# Patient Record
Sex: FEMALE | Race: WHITE | NOT HISPANIC OR LATINO | Employment: OTHER | URBAN - METROPOLITAN AREA
[De-identification: names, ages, dates, MRNs, and addresses within clinical notes are randomized per-mention and may not be internally consistent; named-entity substitution may affect disease eponyms.]

---

## 2023-01-18 ENCOUNTER — TRANSFERRED RECORDS (OUTPATIENT)
Dept: HEALTH INFORMATION MANAGEMENT | Facility: CLINIC | Age: 68
End: 2023-01-18

## 2023-01-23 ENCOUNTER — TRANSCRIBE ORDERS (OUTPATIENT)
Dept: OTHER | Age: 68
End: 2023-01-23

## 2023-01-23 DIAGNOSIS — L23.9 ALLERGIC CONTACT DERMATITIS, UNSPECIFIED CAUSE: Primary | ICD-10-CM

## 2023-02-12 ENCOUNTER — HEALTH MAINTENANCE LETTER (OUTPATIENT)
Age: 68
End: 2023-02-12

## 2023-02-27 ENCOUNTER — TRANSFERRED RECORDS (OUTPATIENT)
Dept: HEALTH INFORMATION MANAGEMENT | Facility: CLINIC | Age: 68
End: 2023-02-27

## 2023-05-03 NOTE — TELEPHONE ENCOUNTER
FUTURE VISIT INFORMATION      FUTURE VISIT INFORMATION:    Date: 7.25.23    Time: 8:00    [place] [facility] [place]:    Referring provider:  Gee    Referring providers clinic:  [facility]    Reason for visit/diagnosis  Patch testing related to possible allergic contact dermatitis around her ostomy site./ referred by Dr Junior/ Gee/ eufemia in Epic/ appt sched per pt    RECORDS REQUESTED FROM:       Clinic name Comments Records Status Imaging Status   [facility] 1.18.23  Gee Received  In Epic

## 2023-07-23 NOTE — PROGRESS NOTES
[facility] Dermato-allergology Note  Office visit  Encounter Date: Jul 25, 2023  ____________________________________________    CC: Stoma site reaction     HPI:  (Jul 25, 2023)  Ms. Estrella Salas is a(n) 68 year old female who presents today as new patient for allergy consultation.     She was referred patch testing related to possible allergic contact dermatitis around her ostomy site/stoma which has been going on for about a year     About a year ago, developed a rash around her stoma that came up suddenly, intensely pruritic, and she tried using antibiotic ointment , nystatin and stoma powder, oral antibiotic course which provided no relief. Had not tried changing the stoma supplies.     Has been having reactions to band-aid, has had crusting and irritation to the ear with wearing earrings however this has cleared up     Sulfa allergy - 2/2023 reported that liver enzymes were elevated with his, no skin eruption or anaphylaxis. No other reactions to medications.     Has had 2 reactions to tocilizumab and abatacept, developed a deeper itching sensation, no prior diagnosis of eczema although had a rash to her scalp which was quite itchy with no visible rash. During tocilizumab, at site of infusion, urticarial reaction and hives on neck.     - otherwise feeling well in usual state of health    Physical exam:  General: In no acute distress, well-developed, well-nourished  Eyes: no conjunctivitis  ENT: no signs of rhinitis   Pulmonary: no wheezing or coughing  Skin: Focused examination of the skin on test sites was performed = see test results below  Focused examination of the abdomen and area surrounding colostomy was performed.  - well demarcated area of erythematous plaque surrounding ostomy site, improved from prior based on photos           Past Medical History:   There is no problem list on file for this patient.    No past medical history on file.    Allergies:  Not on  File    Medications:  No current outpatient medications on file.     No current facility-administered medications for this visit.       Social History:  The patient is retired. Formally an . Patient has the following hobbies or non-occupational exposure, loves to sew, has twin grandchildren. No recent travel or exposures.     Family History:  No family history on file.    Previous Labs, Allergy Tests, Dermatopathology, Imaging:  Previous prick testing - positive for pine allergy, no other allergies at that time     Referred By: Sandi Flynn MD  DERMATOLOGY [address] [address]     Allergy Tests:    Past Allergy Test- past testing about 10 years ago showed allergy to pine needles     Order for Future Allergy Testing:    [x] Outpatient  [] Inpatient: [facility]..../ Bed ....       Skin Atopy (atopic dermatitis) [x] Yes   [] No .........  Contact allergies:   [x] Yes   [] No ..........  Hand eczema:   [] Yes   [x] No           Leading hand:   [] R   [] L       [] Ambidextrous         Drug allergies:        [x] Yes   [] No  which?....Sulfa - LFT rising, biologics are in question..    Urticaria/Angioedema  [] Yes   [x] No .........  Food Allergy:  [] Yes   [x] No  which?......  Pets :  [] Yes   [x] No  which?......         []  Rhinitis   [] Conjunctivitis   [x] Sinusitis   [] Polyposis   [] Otitis   [] Pharyngitis         [x]  Postnasal drip    []  none  Operations:   [] Tonsils   [] Septum   [] Sinus   [] Polyposis        [] Asthma bronchiale   [] Coughing      [x]  none  Symptoms (mostly Rhinoconjunctivitis and Asthma) aggravated by:  Season   [] I   [] II   [] III   [] IV   []V   []VI   []VII   []VIII   []IX   [x]X   [x]XI   [x]XII     [] perennial   Day time      [] morning   [] noon      [] evening        [] night    [] whole day........  [x]  none  Location/changes    [] inside        [] outside   [] mountains    [] sea     [] others.............   [x]  none  Triggers,  specific     [] animals     [] plants     [] dust              [x] others ...........................    []  none  Triggers, others       [] work          [] psyche    [] sport            [] others .............................  [x]  none  Irritant                [] phys efforts [] smoke    [] heat/cold     [] odors  []others............... [x]  none    Order for PATCH TESTS  Reason for tests (suspected allergy): allergic contact dermatitis to stoma products  Known previous allergies: pine   Standardized panels  [x] Standard panel (40 tests)  [x] Preservatives & Antimicrobials (31 tests)  [x] Emulsifiers & Additives (25 tests)   [x] Perfumes/Flavours & Plants (25 tests)  [] Hairdresser panel (12 tests)  [x] Rubber Chemicals (22 tests)  [x] Plastics (26 tests)  [] Colorants/Dyes/Food additives (20 tests)  [] Metals (implants/dental) (24 tests)  [] Local anaesthetics/NSAIDs (13 tests)  [] Antibiotics & Antimycotics (14 tests)   [] Corticosteroids (15 tests)   [] Photopatch test (62 tests)   [] others: ...      [x] Patient's own products: stoma adhesive and stoma paste    DO NOT test if chemical or biological identity is unknown!     always ask from patient the product information and safety sheets (MSDS)       Order for PRICK TESTS    Reason for tests (suspected allergy): atopy?  Known previous allergies: previous tests positive to pine    Standardized prick panels  [x] Atopic panel (20 tests)  [] Pediatric Panel (12 tests)  [] Milk, Meat, Eggs, Grains (20 tests)   [] Dust, Epithelia, Feathers (10 tests)  [] Fish, Seafood, Shellfish (17 tests)  [] Nuts, Beans (14 tests)  [] Spice, Vegetable, Fruit (17 tests)  [] Pollen Panel = Tree, Grass, Weed (24 tests)  [] Others: ...      [] Patient's own products: ...    DO NOT test if chemical or biological identity is unknown!     always ask from patient the product information and safety sheets (MSDS)     Standardized intradermal tests  [] Penicillium notatum [] Aspergillus  fumigatus [] House dust mites D.far & D. pteron  [] Cat    [] dog  [] Others: ...  [] Bee venom   [] Wasp venom  !!Specific protocol with dilutions!!       Order for Drug allergy tests (prick & Intradermal & patch tests)    [] Penicillin G  [] Ampicillin [] Cefazolin   [] Ceftriaxone   [] Ceftazidime  [] Bactrim    [] Others: ...  Order for ... as test date    [] Patient needs consultation with Allergy team (changes of tests may apply)  [x] Tests discussed with Allergy team (can have direct appointment for allergy tests)     ________________________________    Assessment & Plan:    ==> Final Diagnosis:     # around the stoma severe, recurrent dermatitis  DDx allergic contact dermatitis to stoma adhesives or disinfectants  DDx irritant/microbial dermatitis with atopy  * undiagnosed new problem with uncertain prognosis   Patient developed a sudden onset, erythematous rash surrounding her stoma site about a year ago. Stoma related to SBO requiring emergent surgical intervention. Since that time, she has tried nystatin, topical antibiotics, oral antibiotics with no relief. No changes to stoma supplies. At this time, her dermatitis is improved from prior as she has simplified her cleansing regimen. At this time I am most suspicious for a contact dermatitis related to cleansing supplies vs adhesive. Antimicrobial is possible, however seems less likely given treatment attempts. Irritant from bowel contents also unlikely given her good stoma cares and minimal leakage.   - stop using previously used disinfectants   - start gentain violet for cleansing   - mometasone solution twice weekly   - allergy testing will be scheduled as above     # suspicion for atopic predisposition with    Prior prick tests positive to pine pollen  * stable chronic illness  - allergy testing will be scheduled as above     These conclusions are made at the best of one's knowledge and belief based on the provided evidence such as patient's history  and allergy test results and they can change over time or can be incomplete because of missing information's.    ==> Treatment Plan:  - plan for testing as above   - will be placed on priority list     Staff and Resident:  Provider and Laura Roland DO , Derm Resident     Staff Physician Comments:  I saw and evaluated the patient with the resident and I agree with the assessment and plan as documented in the resident's note.    Zak Block MD  Professor  Head of Dermato-Allergy Division  Department of Dermatology  [facility]    Follow-up in Derm-Allergy clinic, as scheduled     I spent a total of 35 minutes with Estrella Salas. This time was spent counseling the patient and/or coordinating care, explaining the allergy tests

## 2023-07-25 ENCOUNTER — OFFICE VISIT (OUTPATIENT)
Dept: ALLERGY | Facility: CLINIC | Age: 68
End: 2023-07-25
Payer: MEDICARE

## 2023-07-25 ENCOUNTER — PRE VISIT (OUTPATIENT)
Dept: ALLERGY | Facility: CLINIC | Age: 68
End: 2023-07-25

## 2023-07-25 DIAGNOSIS — L23.5 ALLERGIC DERMATITIS DUE TO OTHER CHEMICAL PRODUCT: ICD-10-CM

## 2023-07-25 DIAGNOSIS — Z88.9 ATOPY: Primary | ICD-10-CM

## 2023-07-25 PROCEDURE — 99203 OFFICE O/P NEW LOW 30 MIN: CPT | Mod: GC | Performed by: DERMATOLOGY

## 2023-07-25 RX ORDER — MOMETASONE FUROATE 1 MG/ML
SOLUTION TOPICAL
Qty: 60 ML | Refills: 2 | Status: SHIPPED | OUTPATIENT
Start: 2023-07-26

## 2023-07-25 RX ORDER — SULFASALAZINE 500 MG/1
500 TABLET ORAL
COMMUNITY

## 2023-07-25 RX ORDER — ACETAMINOPHEN 500 MG
1000 TABLET ORAL
COMMUNITY

## 2023-07-25 RX ORDER — HYDROXYCHLOROQUINE SULFATE 200 MG/1
400 TABLET, FILM COATED ORAL
COMMUNITY
Start: 2023-02-06

## 2023-07-25 RX ORDER — GABAPENTIN 300 MG/1
CAPSULE ORAL
COMMUNITY

## 2023-07-25 RX ORDER — CALCIUM CARBONATE 260MG(650)
3 TABLET,CHEWABLE ORAL
COMMUNITY

## 2023-07-25 RX ORDER — LINACLOTIDE 145 UG/1
1 CAPSULE, GELATIN COATED ORAL DAILY
COMMUNITY
Start: 2023-07-14

## 2023-07-25 RX ORDER — ALBUTEROL SULFATE 90 UG/1
AEROSOL, METERED RESPIRATORY (INHALATION)
COMMUNITY

## 2023-07-25 RX ORDER — MOMETASONE FUROATE 1 MG/ML
SOLUTION TOPICAL DAILY
Qty: 60 ML | Refills: 2 | Status: SHIPPED | OUTPATIENT
Start: 2023-07-25 | End: 2023-07-25

## 2023-07-25 RX ORDER — RABEPRAZOLE SODIUM 20 MG/1
TABLET, DELAYED RELEASE ORAL
COMMUNITY

## 2023-07-25 RX ORDER — ROPINIROLE 2 MG/1
2 TABLET, FILM COATED, EXTENDED RELEASE ORAL
COMMUNITY
Start: 2022-12-19

## 2023-07-25 RX ORDER — ROSUVASTATIN CALCIUM 40 MG/1
1 TABLET, COATED ORAL DAILY
COMMUNITY
Start: 2023-02-05

## 2023-07-25 RX ORDER — FAMOTIDINE 20 MG/1
20 TABLET, FILM COATED ORAL
COMMUNITY

## 2023-07-25 RX ORDER — TRIAMCINOLONE ACETONIDE 1 MG/G
CREAM TOPICAL 2 TIMES DAILY
COMMUNITY
Start: 2022-12-19

## 2023-07-25 RX ORDER — FLUCONAZOLE 150 MG/1
TABLET ORAL
COMMUNITY
Start: 2022-11-09

## 2023-07-25 RX ORDER — METOPROLOL TARTRATE 25 MG/1
25 TABLET, FILM COATED ORAL
COMMUNITY
Start: 2023-07-18

## 2023-07-25 RX ORDER — ONDANSETRON 4 MG/1
4 TABLET, ORALLY DISINTEGRATING ORAL
COMMUNITY
Start: 2023-07-10

## 2023-07-25 RX ORDER — DULOXETIN HYDROCHLORIDE 20 MG/1
3 CAPSULE, DELAYED RELEASE ORAL DAILY
COMMUNITY
Start: 2023-05-23

## 2023-07-25 RX ORDER — CLOBETASOL PROPIONATE 0.5 MG/G
OINTMENT TOPICAL
COMMUNITY
Start: 2023-07-17

## 2023-07-25 RX ORDER — CYCLOBENZAPRINE HCL 10 MG
10 TABLET ORAL 3 TIMES DAILY PRN
COMMUNITY

## 2023-07-25 RX ORDER — POLYETHYLENE GLYCOL 3350
17 POWDER (GRAM) MISCELLANEOUS
COMMUNITY

## 2023-07-25 RX ORDER — CHLORAL HYDRATE 500 MG
1000 CAPSULE ORAL
COMMUNITY

## 2023-07-25 RX ORDER — ESTRADIOL 0.1 MG/G
CREAM VAGINAL
COMMUNITY
Start: 2022-05-02

## 2023-07-25 RX ORDER — NITROGLYCERIN 0.4 MG/1
TABLET SUBLINGUAL
COMMUNITY
Start: 2023-02-10

## 2023-07-25 RX ORDER — ISOSORBIDE MONONITRATE 60 MG/1
TABLET, EXTENDED RELEASE ORAL
COMMUNITY

## 2023-07-25 RX ORDER — BENZOCAINE/MENTHOL 6 MG-10 MG
LOZENGE MUCOUS MEMBRANE
COMMUNITY

## 2023-07-25 NOTE — NURSING NOTE
Chief Complaint   Patient presents with    Allergy Consult     Here because she has had a rash around stoma for a year that does not go away     Carito DURHAM, RN-BSN  Dermatology Surgery  [phone]

## 2023-07-25 NOTE — Clinical Note
7/25/2023         RE: Estrella Salas  [address]  [place] [address]        Dear Colleague,    Thank you for referring your patient, Estrella Salas, to the [facility] [place]. Please see a copy of my visit note below.    [facility] Dermato-allergology Note  Office visit  Encounter Date: Jul 25, 2023  ____________________________________________    CC: Stoma site reaction     HPI:  (Jul 25, 2023)  Ms. Estrella Salas is a(n) 68 year old female who presents today as new patient for allergy consultation.     She was referred patch testing related to possible allergic contact dermatitis around her ostomy site/stoma which has been going on for about a year     About a year ago, developed a rash around her stoma that came up suddenly, intensely pruritic, and she tried using antibiotic ointment , nystatin and stoma powder, oral antibiotic course which provided no relief. Had not tried changing the stoma supplies.     Has been having reactions to band-aid, has had crusting and irritation to the ear with wearing earrings however this has cleared up     Sulfa allergy - 2/2023 reported that liver enzymes were elevated with his, no skin eruption or anaphylaxis. No other reactions to medications.     Has had 2 reactions to tocilizumab and abatacept, developed a deeper itching sensation, no prior diagnosis of eczema although had a rash to her scalp which was quite itchy with no visible rash. During tocilizumab, at site of infusion, urticarial reaction and hives on neck.     - otherwise feeling well in usual state of health    Physical exam:  General: In no acute distress, well-developed, well-nourished  Eyes: no conjunctivitis  ENT: no signs of rhinitis   Pulmonary: no wheezing or coughing  Skin: Focused examination of the skin on test sites was performed = see test results below  Focused examination of the abdomen and area surrounding colostomy was performed.  - well  demarcated area of erythematous plaque surrounding ostomy site, improved from prior based on photos           Past Medical History:   There is no problem list on file for this patient.    No past medical history on file.    Allergies:  Not on File    Medications:  No current outpatient medications on file.     No current facility-administered medications for this visit.       Social History:  The patient is retired. Formally an . Patient has the following hobbies or non-occupational exposure, loves to sew, has twin grandchildren. No recent travel or exposures.     Family History:  No family history on file.    Previous Labs, Allergy Tests, Dermatopathology, Imaging:  Previous prick testing - positive for pine allergy, no other allergies at that time     Referred By: Sandi Flynn MD  DERMATOLOGY [place] [id number] [address] [address]     Allergy Tests:    Past Allergy Test- past testing about 10 years ago showed allergy to pine needles     Order for Future Allergy Testing:    [x] Outpatient  [] Inpatient: [facility]..../ Bed ....       Skin Atopy (atopic dermatitis) [x] Yes   [] No .........  Contact allergies:   [x] Yes   [] No ..........  Hand eczema:   [] Yes   [x] No           Leading hand:   [] R   [] L       [] Ambidextrous         Drug allergies:        [x] Yes   [] No  which?....Sulfa - LFT rising, biologics are in question..    Urticaria/Angioedema  [] Yes   [x] No .........  Food Allergy:  [] Yes   [x] No  which?......  Pets :  [] Yes   [x] No  which?......         []  Rhinitis   [] Conjunctivitis   [x] Sinusitis   [] Polyposis   [] Otitis   [] Pharyngitis         [x]  Postnasal drip    []  none  Operations:   [] Tonsils   [] Septum   [] Sinus   [] Polyposis        [] Asthma bronchiale   [] Coughing      [x]  none  Symptoms (mostly Rhinoconjunctivitis and Asthma) aggravated by:  Season   [] I   [] II   [] III   [] IV   []V   []VI   []VII   []VIII   []IX   [x]X   [x]XI   [x]XII      [] perennial   Day time      [] morning   [] noon      [] evening        [] night    [] whole day........  [x]  none  Location/changes    [] inside        [] outside   [] mountains    [] sea     [] others.............   [x]  none  Triggers, specific     [] animals     [] plants     [] dust              [x] others ...........................    []  none  Triggers, others       [] work          [] psyche    [] sport            [] others .............................  [x]  none  Irritant                [] phys efforts [] smoke    [] heat/cold     [] odors  []others............... [x]  none    Order for PATCH TESTS  Reason for tests (suspected allergy): allergic contact dermatitis to stoma products  Known previous allergies: pine   Standardized panels  [x] Standard panel (40 tests)  [x] Preservatives & Antimicrobials (31 tests)  [x] Emulsifiers & Additives (25 tests)   [x] Perfumes/Flavours & Plants (25 tests)  [] Hairdresser panel (12 tests)  [x] Rubber Chemicals (22 tests)  [x] Plastics (26 tests)  [] Colorants/Dyes/Food additives (20 tests)  [] Metals (implants/dental) (24 tests)  [] Local anaesthetics/NSAIDs (13 tests)  [] Antibiotics & Antimycotics (14 tests)   [] Corticosteroids (15 tests)   [] Photopatch test (62 tests)   [] others: ...      [x] Patient's own products: stoma adhesive and stoma paste  DO NOT test if chemical or biological identity is unknown!   always ask from patient the product information and safety sheets (MSDS)       Order for PRICK TESTS    Reason for tests (suspected allergy): atopy?  Known previous allergies: previous tests positive to pine    Standardized prick panels  [x] Atopic panel (20 tests)  [] Pediatric Panel (12 tests)  [] Milk, Meat, Eggs, Grains (20 tests)   [] Dust, Epithelia, Feathers (10 tests)  [] Fish, Seafood, Shellfish (17 tests)  [] Nuts, Beans (14 tests)  [] Spice, Vegetable, Fruit (17 tests)  [] Pollen Panel = Tree, Grass, Weed (24 tests)  [] Others: ...      []  Patient's own products: ...  DO NOT test if chemical or biological identity is unknown!   always ask from patient the product information and safety sheets (MSDS)     Standardized intradermal tests  [] Penicillium notatum [] Aspergillus fumigatus [] House dust mites D.far & D. pteron  [] Cat    [] dog  [] Others: ...  [] Bee venom   [] Wasp venom  !!Specific protocol with dilutions!!       Order for Drug allergy tests (prick & Intradermal & patch tests)    [] Penicillin G  [] Ampicillin [] Cefazolin   [] Ceftriaxone   [] Ceftazidime  [] Bactrim    [] Others: ...  Order for ... as test date    [] Patient needs consultation with Allergy team (changes of tests may apply)  [] Tests discussed with Allergy team (can have direct appointment for allergy tests)     ________________________________    Assessment & Plan:    ==> Final Diagnosis:     # around the stoma severe, recurrent dermatitis  DDx allergic contact dermatitis to stoma adhesives or disinfectants  DDx irritant/microbial dermatitis with atopy  * undiagnosed new problem with uncertain prognosis   Patient developed a sudden onset, erythematous rash surrounding her stoma site about a year ago. Stoma related to SBO requiring emergent surgical intervention. Since that time, she has tried nystatin, topical antibiotics, oral antibiotics with no relief. No changes to stoma supplies. At this time, her dermatitis is improved from prior as she has simplified her cleansing regimen. At this time I am most suspicious for a contact dermatitis related to cleansing supplies vs adhesive. Antimicrobial is possible, however seems less likely given treatment attempts. Irritant from bowel contents also unlikely given her good stoma cares and minimal leakage.   - stop using previously used disinfectants   - start gentain violet for cleansing   - mometasone solution twice weekly   - allergy testing will be scheduled as above     # suspicion for atopic predisposition with  Prior prick  tests positive to pine pollen  * stable chronic illness  - allergy testing will be scheduled as above     These conclusions are made at the best of one's knowledge and belief based on the provided evidence such as patient's history and allergy test results and they can change over time or can be incomplete because of missing information's.    ==> Treatment Plan:  - plan for testing as above   - will be placed on priority list     Procedures Performed: Allergy tests, including prick, intradermal and patch tests and drug allergy or provocation tests ordered but not performed today     Staff and Resident:  Provider and Laura Roland DO , Derm Resident     Follow-up in Derm-Allergy clinic, as scheduled     I spent a total of [5:10:15:20:25:30:35:40:45:50:55:60:***] minutes with Estrella Salas. This time was spent counseling the patient and/or coordinating care, explaining the allergy tests *** or procedures, performing allergy tests and assessing the clinical relevance.        Again, thank you for allowing me to participate in the care of your patient.        Sincerely,        Zak Block MD

## 2023-07-25 NOTE — PATIENT INSTRUCTIONS
_____________________________    PATCH TESTING    WHAT IS A PATCH TEST ?    A patch test is a way of identifying whether a substance has caused a delayed reaction with skin inflammation, such as contact eczema or delayed (after days) reactions to drugs. We will use various different types of test compounds, which may include common allergens in occupation and daily life such as  preservatives, fragrances or even drugs. Most of the time we will use standardized, prefabricated test solutions and the choice of the substances and series tested will depend on the history of the allergic reactions. Sometimes we will test your own products you are exposed at home or at work. In order to avoid severe toxic reactions we need detailed information s or safety sheets about each of these test compounds.    HOW IS A PATCH TEST PERFORMED ?    You will be given three appointments to complete this test. On the first appointment the nurse will apply 100-180 small test chambers each one of them containing a different allergen on your back and/or on your arms. We will use hypoallergenic and somehow waterproof adhesive tape. Afterwards the different sites will be marked with a waterproof marker. These patches must stay in place for 2 days. On the second appointment the patches will be removed and the allergy doctor/nurse will evaluate the skin reactions and maybe re-apply the marks. On the third appointment the allergy doctor will re-evaluate possible allergic reactions and discuss with you the nature of the allergens you react to and how to avoid them.    AVOID THE FOLLOWING:    DO NOT wash your back and other test areas during the entire test period (3-5 days), NO bathing or swimming  AVOID strenuous exercise with sweating  DO NOT scratch the test area  AVOID exposure to UV irradiation (sun, therapy)    Patch tests should be performed when the allergy is resolved  Remove patch tests only if severe reaction (itch, pain, blistering)  and report to your doctor immediately. Teresa then the locations of each test field  If patch tests peel off, then try to fix them and record time and teresa test field  No oral steroids (e.g. Prednisone) 1 month prior to tests    WHAT ARE THE POSSIBLE RISKS OF PATCH TESTS ?    If you are allergic to a compound tested you will develop after a few days localized skin reactions similar to your previous allergic reaction. This includes formation of red, itchy skin lesions of few mm to cm with small vesicles and even blisters. The lesions will fade off over days and weeks and might sometimes leave for a few months some skin pigmentations. In rare cases a localized reaction to patch tests can become generalized. The tests with your own products might have some risks because they are not standardized and unanticipated reactions can occur. We need as much information as possible to evaluate if your own products are safe to test and under what conditions. This has to be evaluated for each individual product.        ? WHAT ARE THE PREPARATIONS NEEDED FOR THESE VARIOUS ALLERGY TESTS ?    Some medications can affect the reactions to allergens during the tests. Therefore reveal all the medications you take to the allergy team and the doctor will discuss with you the medications before/during the tests. Normally, you have to respect following rules (unless otherwise instructed by doctor):  For prick, intradermal and provocation tests stop antihistamines (e.g. loratadine (Claritin), cetirizine (Zyrtec), fexofenadine (Allegra) etc 1 week prior to the appointment   For patch tests and provocation tests, stop systemic corticosteroids 1 month and topical steroids 1 week prior to tests  Eat normally and take a shower before tests    _____________________________    SKIN PRICK TESTING    WHAT IS A SKIN PRICK TEST (SPT) ?    A skin prick test (SPT) is a simple and reliable diagnostic test used to identify substances ( allergens )  responsible for triggering the symptoms of allergy. The basic SPT panel includes common allergens, such as house dust mites, molds, dog and cat allergens and grass/tree pollen allergens. We have other more specialized series for various food allergens and sometimes we test your own food directly on your skin. Tests will be usually performed on the skin of the forearm (rarely on back). The skin may develop a red and itchy reaction which can be an indication of an allergy to to tested substance, but will be confirmed by discussion with the allergy specialist    HOW IS A SPT PERFORMED ?    The skin will be disinfected with 70% Isopropanol alcohol, then marked and numbered with a pen to identify the areas where the specific allergens will be tested. Afterwards a drop of the allergen solution will be placed on the skin and then the skin gently pricked using the tip of a specially designed prick needle. With this procedure the most superficial part of the skin will be pierced to allow the test solution to diffuse into the skin and make contact with the reactive immune cells. After 15-30min the area will be examined and evaluated for possible allergic reactions.        WHAT ARE THE POSSIBLE RISKS OF SPT ?    If the skin reacts it will develop red, itchy wheals of 5-30mm diameter. The wheal and itch will usually disappear spontaneously after 1-2 hours. Sometimes there might be a delayed reactions after days and this has to be reported to the treating allergy specialist (could be another kind of reaction pattern and important piece of information). Rarely there are more serious generalized allergic reactions observed and therefore you will be under observation of the allergy team during the entire procedure. There is a small risk for some bleeding and skin infection by the SPT.    _____________________________    INTRADERMAL TESTS      WHAT IS AN INTRADERMAL TEST (IDT) ?    An intradermal test (IDT) is basically a  continuation of the SPT and is sometimes proposed if the skin prick test is negative and the person is strongly suspected to have an allergy to a particular substance. IDT is particularly used for diagnosis of drug allergies and only sterile solutions will be tested by IDT. Because more allergen is delivered to the skin than in SPT the IDT can add more sensitivity to the allergy tests, but with a higher potential risk.     HOW IS AN INTRADERMAL TEST (IDT) PERFORMED ?    A small amount (~ 0.05ml) of the suspected allergen is injected with a very fine insulin needle just beneath the skin. The injections are made at spaced intervals after disinfection and marking of the skin areas. The tests are usually performed on the forearm (rarely back). The initial test will be started with a very diluted solution and if the results are negatives the procedure might be repeated with serial dilutions of higher concentrations. Therefore, the estimated duration of this test is about 45-60 min. Sometimes we observe delayed type reactions to the intradermal tests after 1-4 days and if this is the case take a photo and inform our staff and load the photo into [employer]. Best would be to take the photos with a ruler that we know at which position the positive test was.          WHAT ARE THE POSSIBLE RISKS OF IDT ?    If the skin reacts it will develop red, itchy wheals of 5-30mm diameter. The wheal and itch will usually disappear spontaneously after 1-2 hours. Sometimes there might be a delayed reactions after days and this has to be reported to the treating allergy specialist (could be another kind of reaction pattern and important piece of information). Rarely there are more serious generalized allergic reactions observed and therefore you will be under observation of the allergy team during the entire procedure. Only sterile solutions will be used for injections, but there is still a small risk for infections or unpredictable local  toxic reactions by the allergens. Some transient bleeding might occur.     _____________________________      ORAL PROVOCATION TEST      WHAT IS AN  ORAL PROVOCATION TEST (OPT) ?    An oral provocation test (OPT) is a procedure primarily used to exclude a specific allergy to a particular drug or food. These substances are then administered orally, rarely in case of drugs by subcutaneous or intravenous injections. This test is only conducted if the skin prick and intradermal and/or patch tests were negatives. A formal written consent will be taken prior to the provocation test.         HOW IS AN ORAL PROVOCATION TEST PERFORMED?    For oral (food/drugs) provocation tests you will have to ingest the food or drug hidden in a capsule or in its natural form. The test will usually be placebo-controlled and will include a capsule or other application form not containing the suspected allergen, but non-reactive Mannitol sugar. The various drugs/food will be given at specific time intervals under strict medical supervision.     Two to six serial doses containing the test food or drug will given at normally 30min time intervals, which might vary for each individual. You will be required to stay at the clinic at all times so that the allergy doctors and nurses can early recognize and treat immediately possible allergic reactions. After the last dose you have to stay during at least 1h for observation. The entire procedure will take about 2-6hours, depending on the number of incremental doses and the observation time.     WHAT ARE THE POSSIBLE RISKS OF ORAL PROVOCATION TEST ?    The provocation tests have the highest risk potential of all the tests and therefore close observation is mandatory. The entire procedure will be discussed prior to the test (except when the placebo will be given). The reactions can go from local allergic reactions to more severe generalized reactions. Therefore, we will not perform provocation tests  without prior evaluation by prick or intradermal tests and we plan to exclude an allergy by this test. If there is a higher risk, the test will be performed in a bed and with a secure intravenous access with infusion. The medication of a severe allergic reactions include high dose corticosteroids, antihistamines and if necessary epinephrine (Epi-Pen).    Useful Contact Information    Change of appointments or allergy-related [phone]  Email: [email or web address]  Location/address: [address] [place] [id number]    If you develop any serious or adverse allergic reaction after office hours please seek immediate medical assistance at the nearest clinic or emergency room.

## 2023-08-18 ENCOUNTER — TRANSFERRED RECORDS (OUTPATIENT)
Dept: HEALTH INFORMATION MANAGEMENT | Facility: CLINIC | Age: 68
End: 2023-08-18

## 2023-09-28 ENCOUNTER — TELEPHONE (OUTPATIENT)
Dept: ALLERGY | Facility: CLINIC | Age: 68
End: 2023-09-28
Payer: MEDICARE

## 2023-09-28 NOTE — TELEPHONE ENCOUNTER
Standardized panels  [x] Standard panel (40 tests)  [x] Preservatives & Antimicrobials (31 tests)  [x] Emulsifiers & Additives (25 tests)   [x] Perfumes/Flavours & Plants (25 tests)  [] Hairdresser panel (12 tests)  [x] Rubber Chemicals (22 tests)  [x] Plastics (26 tests)  [] Colorants/Dyes/Food additives (20 tests)  [] Metals (implants/dental) (24 tests)  [] Local anaesthetics/NSAIDs (13 tests)  [] Antibiotics & Antimycotics (14 tests)   [] Corticosteroids (15 tests)   [] Photopatch test (62 tests)   [] others: ...                         [x] Patient's own products: stoma adhesive and stoma paste  DO NOT test if chemical or biological identity is unknown!   always ask from patient the product information and safety sheets (MSDS)     STANDARD Series                                          # Substance 2 days 4 days remarks     1 Adam Mix [C] - -       2 Colophony - -       3  2-Mercaptobenzothiazole  - -       4 Methylisothiazolinone - -       5 Carba Mix - -       6 Thiuram Mix [A] - -       7 Bisphenol A Epoxy Resin - -       8 B-Emrr-Fnbmjwjwbkn-Formaldehyde Resin - -       9 Mercapto Mix [A] - -       10 Black Rubber Mix- PPD [B] - -       11 Potassium Dichromate  -  -       12 Balsam of [place] (Myroxylon Pereirae Resin) - -       13 Nickel Sulphate Hexahydrate - -       14 Mixed Dialkyl Thiourea - -       15 Paraben Mix [B] - -       16 Methyldibromo Glutaronitrile - -       17 Fragrance Mix 8% - -       18 2-Bromo-2-Nitropropane-1,3-Diol (Bronopol) CT - -       19 Lyral - -       20 Tixocortol-21- Pivalate CT - -       21 Diazolidinyl urea (Germall II) - -        22 Methyl Methacrylate - -       23 Cobalt (II) Chloride Hexahydrate - -       24 Fragrance Mix II  - -       25 Compositae Mix - -       26 Benzoyl Peroxide - -       27 Bacitracin - -       28 Formaldehyde - -       29 Methylchloroisothiazolinone / Methylisothiazolinone - -       30 Corticosteroid Mix CT - -       31 Sodium Lauryl Sulfate - -        32 Lanolin Alcohol - -       33 Turpentine - -       34 Cetylstearylalcohol - -       35 Chlorhexidine Dicluconate - -       36 Budesonide - -       37 Imidazolidinyl Urea  - -       38 Ethyl-2 Cyanoacrylate - -       39 Quaternium 15 (Dowicil 200) - -       40 Decyl Glucoside - -      PRESERVATIVES & ANTIMICROBIALS        # Substance 2 days 4 days remarks   41 1  1,2-Benzisothiazoline-3-One, Sodium Salt - -     2  1,3,5-Se (2-Hydroxyethyl) - Hexahydrotriazine (Grotan BK) - -     3 2-Kmcmqprviizyj-4-Nitro-1, 3-Propanediol - -     4  3, 4, 4' - Triclocarban - -    45 5 4 - Chloro - 3 - Cresol - -     6 4 - Chloro - 4 - Xylenol (PCMX) - -     7 7-Ethylbicyclooxazolidine (Bioban [id number]) - -     8 Benzalkonium Chloride CT - -     9 Benzyl Alcohol - -    50 10 Cetalkonium Chloride - -     11 Cetylpyrimidine Chloride  - -     12 Chloroacetamide - -     13 DMDM Hydantoin - -     14 Glutaraldehyde - -    55 15 Triclosan - -     16 Glyoxal Trimeric Dihydrate - -     17 Iodopropynyl Butylcarbamate - -     18 Octylisothiazoline - -     19 Bithionol CT - -    60 20 Bioban P 1487 (Nitrobutyl) Morpholine/(Ethylnitro-Trimethylene) Dimorpholine - -     21 Phenoxyethanol - -     22 Phenyl Salicylate - -     23 Povidone Iodine - -     24 Sodium Benzoate - -    65 25 Sodium Disulfite - -     26 Sorbic Acid - -     27 Thimerosal - -     28 Melamine Formaldehyde Resin - -     29 Ethylenediamine Dihydrochloride - -      Parabens      70 30 Butyl-P-Hydroxybenzoate - -     31 Ethyl-P-Hydroxybenzoate - -     32 Methyl-P-Hydroxybenzoate - -    73 33 Propyl-P-Hydroxybenzoate - -     EMULSIFIERS & ADDITIVES       # Substance 2 days 4 days remarks   74 1 Polyethylene Glycol-400 - -    75 2 Cocamidopropyl Betaine - -     3 Amerchol L101 - -     4 Propylene Glycol - -     5 Triethanolamine - -     6 Sorbitane Sesquiolate CT - -    80 7 Isopropylmyristate - -     8 Polysorbate 80 CT - -     9 Amidoamine   (Stearamidopropyl Dimethylamine) - -      10 Oleamidopropyl Dimethylamine - -     11 Lauryl Glucoside - -    85 12 Coconut Diethanolamide  - -     13 2-Hydroxy-4-Methoxy Benzophenone (Oxybenzone) - -     14 Benzophenone-4 (Sulisobenzon) - -     15 Propolis - -     16 Dexpanthenol - -    90 17 Abitol - -     18 Tert-Butylhydroquinone - -     19 Benzyl Salicylate - -     20 Dimethylaminopropylamin (DMPA) - -     21 Zinc Pyrithione (Zinc Omadine)  - -    95 22 Se(Hydroxymethyl) Nitromethane  - -      Antioxidant       23 Dodecyl Gallate - -     24 Butylhydroxyanisole (BHA) - -     25 Butylhydroxytoluene (BHT) - -     26 Di-Alpha-Tocopherol (Vit E) - -    100 27 Propyl Gallate - -     PERFUMES, FLAVORS & PLANTS        # Substance 2 days 4 days remarks   101 1 Benzyl Cinnamate - -     2 Di-Limonene (Dipentene) - -     3 Cananga Odorata (Oleg Dejesus) (I) - -     4 Lichen Acid Mix - -    105 5 Mentha Piperita Oil (Peppermint Oil) - -     6 Sesquiterpenelactone mix - -     7 Tea Tree Oil, Oxidized - -     8 Wood Tar Mix - -     9 Abietic Acid - -    110 10 Lavendula Angustifolia Oil (Lavender Oil) - -     11 Fragrance mix II CT * 14% - -      Fragrance Mix I       12 Oakmoss Absolute - -     13 Eugenol - -     14 Geraniol - -    115 15 Hydroxycitronellal - -     16 Isoeugenol - -     17 Cinnamic Aldehyde - -     18 Cinnamic Alcohol  - -      Fragrance mix II       19 Citronellol - -    120 20 Alpha-Hexylcinnamic Aldehyde    - -     21 Citral - -     22 Farnesol - -    123 23 Coumarin - -    Hexylcinnamic aldehyde, Coumarin, Farnesol, Hydroxyisohexy3-cyclohexene carboxaldehyde, citral, citrolellol   RUBBER CHEMICALS        # Substance 2 days 4 days remarks     Carbamate      124 1 Zinc Bis ( Diethyldithio carbamate ) (ZDEC) - -    125 2 Zinc Bis (Dibutyldithiocarbamate) - -     3 1,3-Diphenylguanidine (DPG) - -      Thiourea       4 Dibutylthiourea - -     5 Diphenyltiourea - -     6 Thiourea - -      Mercapto chemicals      130 7 Morpholinyl Mercaptobenzothiazole  - -     8 F-Iiuwslxxzl-9-Benzothiazyl-Sulfenamide - -     9 Dibenzothiazyl Disulfide - -      Thiuram chemicals       10 Dipentamethylenethiuram Disulfide - -     11 Tetraethylthiuram Disulfide (Disulfiram) - -    135 12 Tetramethylthiuram Disulfide - -     13 Tetramethyl Thiuram Monosulfide (TMTM) - -      4-Phenylenediamine derivatives       14 N-Isopropyl-N'-Phenyl-P-Phenylenediamine (IPPD) - -     15 Hxlbxtpv-W-Zmogxfzcmffrejot (DPPD) - -      Various Rubber Additives       16 Hydroquinone Monobenzylether  - -    140 17 Hexamethylenetetramine - -     18 4,4'-Dihydroxybiphenyl - -     19 Cyclohexylthiophtalimide - -    143 20 N-Phenyl-B-Naphthylamine - -     PLASTICS (Acrylates/Epoxy Systems)       # Substance 2 days 4 days remarks     Acrylates - -    144 1 2-Hydroxyethyl Methacrylate (HEMA) - -    145 2 1,4-Butandioldimethacrylate (BUDMA) - -     3  2-Ethylhexyl Acrylate - -     4 Bisphenol-A-Dimethacrylate  - -     5 Diurethane-Dimethacrylate - -     6 Ethyleneglycoldimethacrylate (EGDMA) - -    150 7 Pentaerythritoltriacrylate (ESTEFANI) - -     8 Triethylene Glycol Dimethacrylate (TEGDMA) - -      Synthetic material/additives        9 E-Clqc-Cgltjfyynzk - -     10 Tricresyl Phosphate - -     11 8-Axnx-Hwunxgwovdjiz - -    155 12 Dioctyl phtalate(DEHP,DOP) / (Dimethylhexylphthalate)  - -     13 Dibutylphthalate - -     14 Dimethylphthalate - -     15 Toluene-2,4-Diisocyanate - -     16 Diphenylmethane-4,4''-Diisocyanate - -      EPOXY RESIN SYSTEMS        Reactive Solvents - -    160 17 Cresyl Glycidyl Ether - -     18 Butyl Glycidyl Ether - -     19 Phenyl Glycidyl Ether - -     20 1,4-Butanediol Diglycidyl Ether - -     21 1,6-Hexanediole Diglycidyl Ether - -      Hardener / Accelerator - -    165 22 Triethylenetetramine - -     23 Diethylenetriamine - -     24 Isophorone Diamine (IPD) - -     25 N,N-Dimethyl-P-Toluidine - -    169 26 Isobornyl Acrylate - -     OTHER PRODUCTS        # Substance Conc  % solv 2  "days 4 days remarks    1          2          3          4          5          6          7          8          9          10           Patients own products:  è DO NOT test if chemical or biological identity is unknown!   - always ask from patient the product information and safety sheets and consult with the physician   - check neutral pH with pH indicator paper (do not test pH below 5 or over 8 or consult with physician)  - leave-on cosmetics can be tested \"as is\"  - rinse-off products have to be diluted with water, buffer, olive oil or paraffin (discuss with physician)  à remember:   - non-specific toxic/corrosive or biological reactions can occur  - tests with patients own products are not standardized and test conditions are not optimized for patch tests. Whenever possible test with standardized test series and correlate use of product with the result of the patch tests  - if not sure if compounds can be tested under occlusion in patch tests consider open application tests     Results of patch tests:                         Interpretation:  - Negative                    A    = Allergic      (+) Erythema    TI   = Toxic/irritant   + E + Infiltration    RaP = Relevance at Present     ++ E/I + Papulovesicle   Rpr  = Relevance Previously     +++ E/I/P + Blister     nR   = No Relevance     "

## 2023-10-01 NOTE — PROGRESS NOTES
[facility] Dermato-allergology Note  Office visit  Encounter Date: Oct 2, 2023  ____________________________________________    CC: No chief complaint on file.      HPI:  (Oct 2, 2023)  Ms. Estrella Salas is a(n) 68 year old female who presents today as a return patient for allergy tests as planned  - Follow-up in Derm-Allergy clinic, as scheduled   - otherwise feeling well in usual state of health    Physical exam:  General: In no acute distress, well-developed, well-nourished  Eyes: no conjunctivitis  ENT: no signs of rhinitis   Pulmonary: no wheezing or coughing  Skin: Focused examination of the skin on test sites was performed = see test results below  No active eczematous skin lesions on tests sites, particularly back    Earlier History and Allergy exams:  (Jul 25, 2023)  She was referred patch testing related to possible allergic contact dermatitis around her ostomy site/stoma which has been going on for about a year   About a year ago, developed a rash around her stoma that came up suddenly, intensely pruritic, and she tried using antibiotic ointment , nystatin and stoma powder, oral antibiotic course which provided no relief. Had not tried changing the stoma supplies.   Has been having reactions to band-aid, has had crusting and irritation to the ear with wearing earrings however this has cleared up   Sulfa allergy - 2/2023 reported that liver enzymes were elevated with his, no skin eruption or anaphylaxis. No other reactions to medications.   Has had 2 reactions to tocilizumab and abatacept, developed a deeper itching sensation, no prior diagnosis of eczema although had a rash to her scalp which was quite itchy with no visible rash. During tocilizumab, at site of infusion, urticarial reaction and hives on neck.   - well demarcated area of erythematous plaque surrounding ostomy site, improved from prior based on photos           Past Medical History:   There is no problem list on file for  this patient.    No past medical history on file.    Allergies:  Allergies   Allergen Reactions    Sulfa Antibiotics Other (See Comments)     States liver studies went very high when on sulfa drugs.    Abatacept Itching     With subcutaneous and IV preparations, no rash.    Adhesive Tape Rash    Tocilizumab Rash       Medications:  Current Outpatient Medications   Medication    acetaminophen (TYLENOL) 500 MG tablet    albuterol (PROAIR HFA/PROVENTIL HFA/VENTOLIN HFA) 108 (90 Base) MCG/ACT inhaler    clobetasol (TEMOVATE) 0.05 % external ointment    cyclobenzaprine (FLEXERIL) 10 MG tablet    diclofenac (VOLTAREN) 1 % topical gel    DULoxetine (CYMBALTA) 20 MG capsule    estradiol (ESTRACE) 0.1 MG/GM vaginal cream    famotidine (PEPCID) 20 MG tablet    fish oil-omega-3 fatty acids 1000 MG capsule    fluconazole (DIFLUCAN) 150 MG tablet    gabapentin (NEURONTIN) 300 MG capsule    gentian violet 1 % external solution    hydrocortisone (CORTAID) 1 % external cream    hydroxychloroquine (PLAQUENIL) 200 MG tablet    isosorbide mononitrate (IMDUR) 60 MG 24 hr tablet    LINZESS 145 MCG capsule    Magnesium Citrate 100 MG TABS    metoprolol tartrate (LOPRESSOR) 25 MG tablet    mometasone (ELOCON) 0.1 % external solution    nitroGLYcerin (NITROSTAT) 0.4 MG sublingual tablet    ondansetron (ZOFRAN ODT) 4 MG ODT tab    polyethylene glycol 3350 POWD    Probiotic Product (MISC INTESTINAL FERNANDO REGULAT) CAPS    RABEprazole (ACIPHEX) 20 MG EC tablet    rOPINIRole (REQUIP ER) 2 MG 24 hr tablet    rosuvastatin (CRESTOR) 40 MG tablet    sulfaSALAzine (AZULFIDINE) 500 MG tablet    triamcinolone (KENALOG) 0.1 % external cream    vitamin B-12 (CYANOCOBALAMIN) 1000 MCG tablet     No current facility-administered medications for this visit.       Social History:  The patient is retired. Formally an . Patient has the following hobbies or non-occupational exposure, loves to sew, has twin grandchildren. No recent travel or  exposures.     Family History:  No family history on file.    Previous Labs, Allergy Tests, Dermatopathology, Imaging:  Previous prick testing - positive for pine allergy, no other allergies at that time     Referred By: Sandi Flynn MD  DERMATOLOGY [place] [id number] [facility] [address]  [place] [address]     Allergy Tests:    Past Allergy Test- past testing about 10 years ago showed allergy to pine needles     Order for Future Allergy Testing:    [x] Outpatient  [] Inpatient: Monae..../ Bed ....       Skin Atopy (atopic dermatitis) [x] Yes   [] No .........  Contact allergies:   [x] Yes   [] No ..........  Hand eczema:   [] Yes   [x] No           Leading hand:   [] R   [] L       [] Ambidextrous         Drug allergies:        [x] Yes   [] No  which?....Sulfa - LFT rising, biologics are in question..    Urticaria/Angioedema  [] Yes   [x] No .........  Food Allergy:  [] Yes   [x] No  which?......  Pets :  [] Yes   [x] No  which?......         []  Rhinitis   [] Conjunctivitis   [x] Sinusitis   [] Polyposis   [] Otitis   [] Pharyngitis         [x]  Postnasal drip    []  none  Operations:   [] Tonsils   [] Septum   [] Sinus   [] Polyposis        [] Asthma bronchiale   [] Coughing      [x]  none  Symptoms (mostly Rhinoconjunctivitis and Asthma) aggravated by:  Season   [] I   [] II   [] III   [] IV   []V   []VI   []VII   []VIII   []IX   [x]X   [x]XI   [x]XII     [] perennial   Day time      [] morning   [] noon      [] evening        [] night    [] whole day........  [x]  none  Location/changes    [] inside        [] outside   [] mountains    [] sea     [] others.............   [x]  none  Triggers, specific     [] animals     [] plants     [] dust              [x] others ...........................    []  none  Triggers, others       [] work          [] psyche    [] sport            [] others .............................  [x]  none  Irritant                [] phys efforts [] smoke    [] heat/cold     [] odors   []others............... [x]  none    Order for PATCH TESTS  Reason for tests (suspected allergy): allergic contact dermatitis to stoma products  Known previous allergies: pine   Standardized panels  [x] Standard panel (40 tests)  [x] Preservatives & Antimicrobials (31 tests)  [x] Emulsifiers & Additives (25 tests)   [x] Perfumes/Flavours & Plants (25 tests)  [] Hairdresser panel (12 tests)  [x] Rubber Chemicals (22 tests)  [x] Plastics (26 tests)  [] Colorants/Dyes/Food additives (20 tests)  [] Metals (implants/dental) (24 tests)  [] Local anaesthetics/NSAIDs (13 tests)  [] Antibiotics & Antimycotics (14 tests)   [] Corticosteroids (15 tests)   [] Photopatch test (62 tests)   [] others: ...      [x] Patient's own products: stoma adhesive and stoma paste  DO NOT test if chemical or biological identity is unknown!   always ask from patient the product information and safety sheets (MSDS)       Order for PRICK TESTS    Reason for tests (suspected allergy): atopy?  Known previous allergies: previous tests positive to pine    Standardized prick panels  [x] Atopic panel (20 tests)  [] Pediatric Panel (12 tests)  [] Milk, Meat, Eggs, Grains (20 tests)   [] Dust, Epithelia, Feathers (10 tests)  [] Fish, Seafood, Shellfish (17 tests)  [] Nuts, Beans (14 tests)  [] Spice, Vegetable, Fruit (17 tests)  [] Pollen Panel = Tree, Grass, Weed (24 tests)  [] Others: ...      [] Patient's own products: ...  DO NOT test if chemical or biological identity is unknown!   always ask from patient the product information and safety sheets (MSDS)     Standardized intradermal tests  [] Penicillium notatum [] Aspergillus fumigatus [] House dust mites D.far & D. pteron  [] Cat    [] dog  [] Others: ...  [] Bee venom   [] Wasp venom  !!Specific protocol with dilutions!!       Order for Drug allergy tests (prick & Intradermal & patch tests)    [] Penicillin G  [] Ampicillin [] Cefazolin   [] Ceftriaxone   [] Ceftazidime  [] Bactrim    [] Others:  ...  Order for ... as test date  ________________________________  RESULTS & EVALUATION of PATCH TESTS    Oct 2, 2023 application of patch tests:    Patch test readings after     [x] 2 days, [] 3 days [x] 4 days, [] 5 days,  Other duration: ...    STANDARD Series                                          # Substance 2 days 4 days remarks     1 Adam Mix [C] - -       2 Colophony - -       3  2-Mercaptobenzothiazole  - -       4 Methylisothiazolinone - -       5 Carba Mix - -       6 Thiuram Mix [A] - -       7 Bisphenol A Epoxy Resin - -       8 O-Nlvf-Hfaenfuebzk-Formaldehyde Resin - -       9 Mercapto Mix [A] - -       10 Black Rubber Mix- PPD [B] - -       11 Potassium Dichromate  -  -       12 Balsam of [place] (Myroxylon Pereirae Resin) - -       13 Nickel Sulphate Hexahydrate - -       14 Mixed Dialkyl Thiourea - -       15 Paraben Mix [B] - -       16 Methyldibromo Glutaronitrile - -       17 Fragrance Mix 8% - -       18 2-Bromo-2-Nitropropane-1,3-Diol (Bronopol) CT - -       19 Lyral - -       20 Tixocortol-21- Pivalate CT - -       21 Diazolidinyl urea (Germall II) - -        22 Methyl Methacrylate - -       23 Cobalt (II) Chloride Hexahydrate - -       24 Fragrance Mix II  - -       25 Compositae Mix - -       26 Benzoyl Peroxide - -       27 Bacitracin - -       28 Formaldehyde - -       29 Methylchloroisothiazolinone / Methylisothiazolinone - -       30 Corticosteroid Mix CT - -       31 Sodium Lauryl Sulfate - -       32 Lanolin Alcohol - -       33 Turpentine - -       34 Cetylstearylalcohol - -       35 Chlorhexidine Dicluconate - -       36 Budesonide - -       37 Imidazolidinyl Urea  - -       38 Ethyl-2 Cyanoacrylate - -       39 Quaternium 15 (Dowicil 200) - -       40 Decyl Glucoside - -      PRESERVATIVES & ANTIMICROBIALS        # Substance 2 days 4 days remarks   41 1  1,2-Benzisothiazoline-3-One, Sodium Salt - -     2  1,3,5-Se (2-Hydroxyethyl) - Hexahydrotriazine (Mart ALFRED) - -     3  1-Rlyujrmgfayyk-2-Nitro-1, 3-Propanediol - -     4  3, 4, 4' - Triclocarban - -    45 5 4 - Chloro - 3 - Cresol - -     6 4 - Chloro - 4 - Xylenol (PCMX) - -     7 7-Ethylbicyclooxazolidine (Bioban TX9931) - -     8 Benzalkonium Chloride CT - -     9 Benzyl Alcohol - -    50 10 Cetalkonium Chloride - -     11 Cetylpyrimidine Chloride  - -     12 Chloroacetamide - -     13 DMDM Hydantoin - -     14 Glutaraldehyde - -    55 15 Triclosan - -     16 Glyoxal Trimeric Dihydrate - -     17 Iodopropynyl Butylcarbamate - -     18 Octylisothiazoline - -     19 Bithionol CT NA NA    60 20 Bioban P 1487 (Nitrobutyl) Morpholine/(Ethylnitro-Trimethylene) Dimorpholine - -     21 Phenoxyethanol - -     22 Phenyl Salicylate - -     23 Povidone Iodine - -     24 Sodium Benzoate - -    65 25 Sodium Disulfite - -     26 Sorbic Acid - -     27 Thimerosal - -     28 Melamine Formaldehyde Resin - -     29 Ethylenediamine Dihydrochloride - -      Parabens      70 30 Butyl-P-Hydroxybenzoate - -     31 Ethyl-P-Hydroxybenzoate - -     32 Methyl-P-Hydroxybenzoate - -    73 33 Propyl-P-Hydroxybenzoate - -     EMULSIFIERS & ADDITIVES       # Substance 2 days 4 days remarks   74 1 Polyethylene Glycol-400 - -    75 2 Cocamidopropyl Betaine - -     3 Amerchol L101 - -     4 Propylene Glycol - -     5 Triethanolamine - -     6 Sorbitane Sesquiolate CT - -    80 7 Isopropylmyristate - -     8 Polysorbate 80 CT - -     9 Amidoamine   (Stearamidopropyl Dimethylamine) - -     10 Oleamidopropyl Dimethylamine - -     11 Lauryl Glucoside - -    85 12 Coconut Diethanolamide  - -     13 2-Hydroxy-4-Methoxy Benzophenone (Oxybenzone) - -     14 Benzophenone-4 (Sulisobenzon) - -     15 Propolis - -     16 Dexpanthenol - -    90 17 Abitol - -     18 Tert-Butylhydroquinone - -     19 Benzyl Salicylate - -     20 Dimethylaminopropylamin (DMPA) - -     21 Zinc Pyrithione (Zinc Omadine)  - -    95 22 Se(Hydroxymethyl) Nitromethane  - -      Antioxidant       23  Dodecyl Gallate - -     24 Butylhydroxyanisole (BHA) - -     25 Butylhydroxytoluene (BHT) - -     26 Di-Alpha-Tocopherol (Vit E) - -    100 27 Propyl Gallate - -     PERFUMES, FLAVORS & PLANTS        # Substance 2 days 4 days remarks   101 1 Benzyl Cinnamate - -     2 Di-Limonene (Dipentene) - -     3 Cananga Odorata (Oleg Dejesus) (I) - -     4 Lichen Acid Mix - -    105 5 Mentha Piperita Oil (Peppermint Oil) - -     6 Sesquiterpenelactone mix - -     7 Tea Tree Oil, Oxidized - -     8 Wood Tar Mix - -     9 Abietic Acid - -    110 10 Lavendula Angustifolia Oil (Lavender Oil) - -     11 Fragrance mix II CT * 14% - -      Fragrance Mix I       12 [place]moss Absolute - -     13 Eugenol - -     14 Geraniol - -    115 15 Hydroxycitronellal - -     16 Isoeugenol - -     17 Cinnamic Aldehyde - -     18 Cinnamic Alcohol  - -      Fragrance mix II       19 Citronellol - -    120 20 Alpha-Hexylcinnamic Aldehyde    - -     21 Citral - -     22 Farnesol - -    123 23 Coumarin - -    Hexylcinnamic aldehyde, Coumarin, Farnesol, Hydroxyisohexy3-cyclohexene carboxaldehyde, citral, citrolellol   RUBBER CHEMICALS        # Substance 2 days 4 days remarks     Carbamate      124 1 Zinc Bis ( Diethyldithio carbamate ) (ZDEC) - -    125 2 Zinc Bis (Dibutyldithiocarbamate) - -     3 1,3-Diphenylguanidine (DPG) - -      Thiourea       4 Dibutylthiourea - -     5 Diphenyltiourea - -     6 Thiourea - -      Mercapto chemicals      130 7 Morpholinyl Mercaptobenzothiazole - -     8 E-Smtkqrapbi-3-Benzothiazyl-Sulfenamide - -     9 Dibenzothiazyl Disulfide - -      Thiuram chemicals       10 Dipentamethylenethiuram Disulfide - -     11 Tetraethylthiuram Disulfide (Disulfiram) - -    135 12 Tetramethylthiuram Disulfide - -     13 Tetramethyl Thiuram Monosulfide (TMTM) - -      4-Phenylenediamine derivatives       14 N-Isopropyl-N'-Phenyl-P-Phenylenediamine (IPPD) - -     15 Xgbfrbbf-R-Trxfchxyywojwcif (DPPD) - -      Various Rubber Additives        16 Hydroquinone Monobenzylether  - -    140 17 Hexamethylenetetramine - -     18 4,4'-Dihydroxybiphenyl - -     19 Cyclohexylthiophtalimide - -    143 20 N-Phenyl-B-Naphthylamine - -     PLASTICS (Acrylates/Epoxy Systems)       # Substance 2 days 4 days remarks     Acrylates - -    144 1 2-Hydroxyethyl Methacrylate (HEMA) - -    145 2 1,4-Butandioldimethacrylate (BUDMA) - -     3  2-Ethylhexyl Acrylate - -     4 Bisphenol-A-Dimethacrylate  - -     5 Diurethane-Dimethacrylate - -     6 Ethyleneglycoldimethacrylate (EGDMA) - -    150 7 Pentaerythritoltriacrylate (ESTEFANI) - -     8 Triethylene Glycol Dimethacrylate (TEGDMA) - -      Synthetic material/additives        9 Q-Lkbt-Rwzviydlxsa - -     10 Tricresyl Phosphate - -     11 9-Qsly-Cinmgxghmcthq - -    155 12 Dioctyl phtalate(DEHP,DOP) / (Dimethylhexylphthalate)  - -     13 Dibutylphthalate - -     14 Dimethylphthalate - -     15 Toluene-2,4-Diisocyanate NA NA     16 Diphenylmethane-4,4''-Diisocyanate NA NA      EPOXY RESIN SYSTEMS        Reactive Solvents - -    160 17 Cresyl Glycidyl Ether NA NA     18 Butyl Glycidyl Ether - -     19 Phenyl Glycidyl Ether - -     20 1,4-Butanediol Diglycidyl Ether - -     21 1,6-Hexanediole Diglycidyl Ether - -      Hardener / Accelerator - -    165 22 Triethylenetetramine - -     23 Diethylenetriamine - -     24 Isophorone Diamine (IPD) - -     25 N,N-Dimethyl-P-Toluidine - -    169 26 Isobornyl Acrylate - -     OTHER PRODUCTS   stoma adhesive and stoma paste       # Substance Conc  % solv 2 days 4 days remarks   170 1 Cavilon barrier film As is        2 Aretha stoma cleanser As is        3 Stoma bag adhesive As is       173 4 Barrier strip adhesive As is         Patients own products:  è DO NOT test if chemical or biological identity is unknown!   - always ask from patient the product information and safety sheets and consult with the physician   - check neutral pH with pH indicator paper (do not test pH below 5 or over 8 or  "consult with physician)  - leave-on cosmetics can be tested \"as is\"  - rinse-off products have to be diluted with water, buffer, olive oil or paraffin (discuss with physician)  à remember:   - non-specific toxic/corrosive or biological reactions can occur  - tests with patients own products are not standardized and test conditions are not optimized for patch tests. Whenever possible test with standardized test series and correlate use of product with the result of the patch tests  - if not sure if compounds can be tested under occlusion in patch tests consider open application tests     Results of patch tests:                         Interpretation:  - Negative                    A    = Allergic      (+) Erythema    TI   = Toxic/irritant   + E + Infiltration    RaP = Relevance at Present     ++ E/I + Papulovesicle   Rpr  = Relevance Previously     +++ E/I/P + Blister     nR   = No Relevance    Atopy Screen (Placed Oct 2, 2023)  No Substance Readings (15 min) Evaluation   POS Histamine 1mg/ml +/++    NEG NaCl 0.9% -      No Substance Readings (15 min) Evaluation   1 Alternaria alternata (tenuis)  -    2 Cladosporium herbarum -    3 Aspergillus fumigatus -    4 Penicillium notatum -    5 Dermatophagoides pteronyssinus -    6 Dermatophagoides farinae -    7 Dog epithelium (canis spp) -    8 Cat hair (francia catus) -    9 Cockroach   (Blatella americana & germanica) -    10 Grass mix midwest   (Marianne, Orchard, Redtop, Wilber) -    11 Daniel grass (sorghum halepense) -    12 Weed mix   (common Cocklebur, Lamb s quarters, rough redroot Pigweed, Dock/Sorrel) -    13 Mug wort (artemisia vulgare) -    14 Ragweed giant/short (ambrosia spp) -    15 White birch (Betula papyrifera) -    16 Tree mix 1 (Pecan, Maple BHR, Oak RVW, american [place], black [place]) -    17 Red cedar (juniperus virginia) -    18 Tree mix 2   (white [place], river/red Birch, black [place], common [place], american Elm) -    19 Box elder/Maple mix (acer " spp) -    20 [place] shagbark (carya ovata) -           Conclusion: prick tests negatives, no signs for atopy     [] No relevant allergic reaction observed    [] Allergic reaction diagnosed against following allergens:      Interpretation/ remarks:   See later    [] Patient information given   [] ACDS CAMP information's (# ....) to following compounds: .....   [] General information's to following compounds: ......      Assessment & Plan:    ==> Final Diagnosis:     # around the stoma severe, recurrent dermatitis  DDx allergic contact dermatitis to stoma adhesives or disinfectants  DDx irritant/microbial dermatitis with atopy  * undiagnosed new problem with uncertain prognosis   Patient developed a sudden onset, erythematous rash surrounding her stoma site about a year ago. Stoma related to SBO requiring emergent surgical intervention. Since that time, she has tried nystatin, topical antibiotics, oral antibiotics with no relief. No changes to stoma supplies. At this time, her dermatitis is improved from prior as she has simplified her cleansing regimen. At this time I am most suspicious for a contact dermatitis related to cleansing supplies vs adhesive. Antimicrobial is possible, however seems less likely given treatment attempts. Irritant from bowel contents also unlikely given her good stoma cares and minimal leakage.   - stop using previously used disinfectants   - start gentain violet for cleansing   - mometasone solution twice weekly   - allergy testing will be scheduled as above     # suspicion for atopic predisposition with  Prior prick tests positive to pine pollen  Prick tests negatives!  * stable chronic illness    These conclusions are made at the best of one's knowledge and belief based on the provided evidence such as patient's history and allergy test results and they can change over time or can be incomplete because of missing information's.    ==> Treatment Plan:  - plan for testing as above   - will  be placed on priority list      Procedures Performed: Allergy tests, including prick and patch tests     Staff: : provider    Follow-up in Derm-Allergy clinic for 1st readings of patch tests after 2 days and 2nd readings and final conclusions after 4 days   ___________________________    I spent a total of 34 minutes with Estrella Salas during today s  visit. This time was spent discussing all the individual test results, correlating them to the clinical relevance, counseling the patient and/or coordinating care and performing allergy tests

## 2023-10-02 ENCOUNTER — OFFICE VISIT (OUTPATIENT)
Dept: ALLERGY | Facility: CLINIC | Age: 68
End: 2023-10-02
Payer: MEDICARE

## 2023-10-02 DIAGNOSIS — Z88.9 ATOPY: ICD-10-CM

## 2023-10-02 DIAGNOSIS — L23.5 ALLERGIC DERMATITIS DUE TO OTHER CHEMICAL PRODUCT: Primary | ICD-10-CM

## 2023-10-02 PROCEDURE — 95044 PATCH/APPLICATION TESTS: CPT | Performed by: DERMATOLOGY

## 2023-10-02 PROCEDURE — 95004 PERQ TESTS W/ALRGNC XTRCS: CPT | Performed by: DERMATOLOGY

## 2023-10-02 NOTE — NURSING NOTE
Chief Complaint   Patient presents with    Allergy Testing Followup     Patch testing day 1     Ashleigh Pacheco RN

## 2023-10-02 NOTE — LETTER
10/2/2023         RE: Estrella Salas  [address]  [place] [facility] [address]        Dear Colleague,    Thank you for referring your patient, Estrella Salas, to the [facility]. Please see a copy of my visit note below.    [facility] Note  Office visit  Encounter Date: Oct 2, 2023  ____________________________________________    CC: No chief complaint on file.      HPI:  (Oct 2, 2023)  Ms. Estrella Salas is a(n) 68 year old female who presents today as a return patient for allergy tests as planned  - Follow-up in Derm-Allergy clinic, as scheduled   - otherwise feeling well in usual state of health    Physical exam:  General: In no acute distress, well-developed, well-nourished  Eyes: no conjunctivitis  ENT: no signs of rhinitis   Pulmonary: no wheezing or coughing  Skin: Focused examination of the skin on test sites was performed = see test results below  No active eczematous skin lesions on tests sites, particularly back    Earlier History and Allergy exams:  (Jul 25, 2023)  She was referred patch testing related to possible allergic contact dermatitis around her ostomy site/stoma which has been going on for about a year   About a year ago, developed a rash around her stoma that came up suddenly, intensely pruritic, and she tried using antibiotic ointment , nystatin and stoma powder, oral antibiotic course which provided no relief. Had not tried changing the stoma supplies.   Has been having reactions to band-aid, has had crusting and irritation to the ear with wearing earrings however this has cleared up   Sulfa allergy - 2/2023 reported that liver enzymes were elevated with his, no skin eruption or anaphylaxis. No other reactions to medications.   Has had 2 reactions to tocilizumab and abatacept, developed a deeper itching sensation, no prior diagnosis of eczema although had a rash to her scalp which was quite itchy with no visible rash.  During tocilizumab, at site of infusion, urticarial reaction and hives on neck.   - well demarcated area of erythematous plaque surrounding ostomy site, improved from prior based on photos           Past Medical History:   There is no problem list on file for this patient.    No past medical history on file.    Allergies:  Allergies   Allergen Reactions     Sulfa Antibiotics Other (See Comments)     States liver studies went very high when on sulfa drugs.     Abatacept Itching     With subcutaneous and IV preparations, no rash.     Adhesive Tape Rash     Tocilizumab Rash       Medications:  Current Outpatient Medications   Medication     acetaminophen (TYLENOL) 500 MG tablet     albuterol (PROAIR HFA/PROVENTIL HFA/VENTOLIN HFA) 108 (90 Base) MCG/ACT inhaler     clobetasol (TEMOVATE) 0.05 % external ointment     cyclobenzaprine (FLEXERIL) 10 MG tablet     diclofenac (VOLTAREN) 1 % topical gel     DULoxetine (CYMBALTA) 20 MG capsule     estradiol (ESTRACE) 0.1 MG/GM vaginal cream     famotidine (PEPCID) 20 MG tablet     fish oil-omega-3 fatty acids 1000 MG capsule     fluconazole (DIFLUCAN) 150 MG tablet     gabapentin (NEURONTIN) 300 MG capsule     gentian violet 1 % external solution     hydrocortisone (CORTAID) 1 % external cream     hydroxychloroquine (PLAQUENIL) 200 MG tablet     isosorbide mononitrate (IMDUR) 60 MG 24 hr tablet     LINZESS 145 MCG capsule     Magnesium Citrate 100 MG TABS     metoprolol tartrate (LOPRESSOR) 25 MG tablet     mometasone (ELOCON) 0.1 % external solution     nitroGLYcerin (NITROSTAT) 0.4 MG sublingual tablet     ondansetron (ZOFRAN ODT) 4 MG ODT tab     polyethylene glycol 3350 POWD     Probiotic Product (MISC INTESTINAL FERNANDO REGULAT) CAPS     RABEprazole (ACIPHEX) 20 MG EC tablet     rOPINIRole (REQUIP ER) 2 MG 24 hr tablet     rosuvastatin (CRESTOR) 40 MG tablet     sulfaSALAzine (AZULFIDINE) 500 MG tablet     triamcinolone (KENALOG) 0.1 % external cream     vitamin B-12  (CYANOCOBALAMIN) 1000 MCG tablet     No current facility-administered medications for this visit.       Social History:  The patient is retired. Formally an . Patient has the following hobbies or non-occupational exposure, loves to sew, has twin grandchildren. No recent travel or exposures.     Family History:  No family history on file.    Previous Labs, Allergy Tests, Dermatopathology, Imaging:  Previous prick testing - positive for pine allergy, no other allergies at that time     Referred By: Sandi Flynn MD  DERMATOLOGY [address] [address]     Allergy Tests:    Past Allergy Test- past testing about 10 years ago showed allergy to pine needles     Order for Future Allergy Testing:    [x] Outpatient  [] Inpatient: [facility]..../ Bed ....       Skin Atopy (atopic dermatitis) [x] Yes   [] No .........  Contact allergies:   [x] Yes   [] No ..........  Hand eczema:   [] Yes   [x] No           Leading hand:   [] R   [] L       [] Ambidextrous         Drug allergies:        [x] Yes   [] No  which?....Sulfa - LFT rising, biologics are in question..    Urticaria/Angioedema  [] Yes   [x] No .........  Food Allergy:  [] Yes   [x] No  which?......  Pets :  [] Yes   [x] No  which?......         []  Rhinitis   [] Conjunctivitis   [x] Sinusitis   [] Polyposis   [] Otitis   [] Pharyngitis         [x]  Postnasal drip    []  none  Operations:   [] Tonsils   [] Septum   [] Sinus   [] Polyposis        [] Asthma bronchiale   [] Coughing      [x]  none  Symptoms (mostly Rhinoconjunctivitis and Asthma) aggravated by:  Season   [] I   [] II   [] III   [] IV   []V   []VI   []VII   []VIII   []IX   [x]X   [x]XI   [x]XII     [] perennial   Day time      [] morning   [] noon      [] evening        [] night    [] whole day........  [x]  none  Location/changes    [] inside        [] outside   [] mountains    [] sea     [] others.............   [x]  none  Triggers, specific     [] animals     []  plants     [] dust              [x] others ...........................    []  none  Triggers, others       [] work          [] psyche    [] sport            [] others .............................  [x]  none  Irritant                [] phys efforts [] smoke    [] heat/cold     [] odors  []others............... [x]  none    Order for PATCH TESTS  Reason for tests (suspected allergy): allergic contact dermatitis to stoma products  Known previous allergies: pine   Standardized panels  [x] Standard panel (40 tests)  [x] Preservatives & Antimicrobials (31 tests)  [x] Emulsifiers & Additives (25 tests)   [x] Perfumes/Flavours & Plants (25 tests)  [] Hairdresser panel (12 tests)  [x] Rubber Chemicals (22 tests)  [x] Plastics (26 tests)  [] Colorants/Dyes/Food additives (20 tests)  [] Metals (implants/dental) (24 tests)  [] Local anaesthetics/NSAIDs (13 tests)  [] Antibiotics & Antimycotics (14 tests)   [] Corticosteroids (15 tests)   [] Photopatch test (62 tests)   [] others: ...      [x] Patient's own products: stoma adhesive and stoma paste  DO NOT test if chemical or biological identity is unknown!   always ask from patient the product information and safety sheets (MSDS)       Order for PRICK TESTS    Reason for tests (suspected allergy): atopy?  Known previous allergies: previous tests positive to pine    Standardized prick panels  [x] Atopic panel (20 tests)  [] Pediatric Panel (12 tests)  [] Milk, Meat, Eggs, Grains (20 tests)   [] Dust, Epithelia, Feathers (10 tests)  [] Fish, Seafood, Shellfish (17 tests)  [] Nuts, Beans (14 tests)  [] Spice, Vegetable, Fruit (17 tests)  [] Pollen Panel = Tree, Grass, Weed (24 tests)  [] Others: ...      [] Patient's own products: ...  DO NOT test if chemical or biological identity is unknown!   always ask from patient the product information and safety sheets (MSDS)     Standardized intradermal tests  [] Penicillium notatum [] Aspergillus fumigatus [] House dust mites DJudyfar & D.  pteron  [] Cat    [] dog  [] Others: ...  [] Bee venom   [] Wasp venom  !!Specific protocol with dilutions!!       Order for Drug allergy tests (prick & Intradermal & patch tests)    [] Penicillin G  [] Ampicillin [] Cefazolin   [] Ceftriaxone   [] Ceftazidime  [] Bactrim    [] Others: ...  Order for ... as test date  ________________________________  RESULTS & EVALUATION of PATCH TESTS    Oct 2, 2023 application of patch tests:    Patch test readings after     [x] 2 days, [] 3 days [x] 4 days, [] 5 days,  Other duration: ...    STANDARD Series                                          # Substance 2 days 4 days remarks     1 Adam Mix [C] - -       2 Colophony - -       3  2-Mercaptobenzothiazole  - -       4 Methylisothiazolinone - -       5 Carba Mix - -       6 Thiuram Mix [A] - -       7 Bisphenol A Epoxy Resin - -       8 D-Hzsi-Twtzjtybask-Formaldehyde Resin - -       9 Mercapto Mix [A] - -       10 Black Rubber Mix- PPD [B] - -       11 Potassium Dichromate  -  -       12 Balsam of [place] (Myroxylon Pereirae Resin) - -       13 Nickel Sulphate Hexahydrate - -       14 Mixed Dialkyl Thiourea - -       15 Paraben Mix [B] - -       16 Methyldibromo Glutaronitrile - -       17 Fragrance Mix 8% - -       18 2-Bromo-2-Nitropropane-1,3-Diol (Bronopol) CT - -       19 Lyral - -       20 Tixocortol-21- Pivalate CT - -       21 Diazolidinyl urea (Germall II) - -        22 Methyl Methacrylate - -       23 Cobalt (II) Chloride Hexahydrate - -       24 Fragrance Mix II  - -       25 Compositae Mix - -       26 Benzoyl Peroxide - -       27 Bacitracin - -       28 Formaldehyde - -       29 Methylchloroisothiazolinone / Methylisothiazolinone - -       30 Corticosteroid Mix CT - -       31 Sodium Lauryl Sulfate - -       32 Lanolin Alcohol - -       33 Turpentine - -       34 Cetylstearylalcohol - -       35 Chlorhexidine Dicluconate - -       36 Budesonide - -       37 Imidazolidinyl Urea  - -       38 Ethyl-2 Cyanoacrylate -  -       39 Quaternium 15 (Dowicil 200) - -       40 Decyl Glucoside - -      PRESERVATIVES & ANTIMICROBIALS        # Substance 2 days 4 days remarks   41 1  1,2-Benzisothiazoline-3-One, Sodium Salt - -     2  1,3,5-Se (2-Hydroxyethyl) - Hexahydrotriazine (Grotan BK) - -     3 4-Vpnlzurklqfft-0-Nitro-1, 3-Propanediol - -     4  3, 4, 4' - Triclocarban - -    45 5 4 - Chloro - 3 - Cresol - -     6 4 - Chloro - 4 - Xylenol (PCMX) - -     7 7-Ethylbicyclooxazolidine (Bioban [id number]) - -     8 Benzalkonium Chloride CT - -     9 Benzyl Alcohol - -    50 10 Cetalkonium Chloride - -     11 Cetylpyrimidine Chloride  - -     12 Chloroacetamide - -     13 DMDM Hydantoin - -     14 Glutaraldehyde - -    55 15 Triclosan - -     16 Glyoxal Trimeric Dihydrate - -     17 Iodopropynyl Butylcarbamate - -     18 Octylisothiazoline - -     19 Bithionol CT NA NA    60 20 Bioban P 1487 (Nitrobutyl) Morpholine/(Ethylnitro-Trimethylene) Dimorpholine - -     21 Phenoxyethanol - -     22 Phenyl Salicylate - -     23 Povidone Iodine - -     24 Sodium Benzoate - -    65 25 Sodium Disulfite - -     26 Sorbic Acid - -     27 Thimerosal - -     28 Melamine Formaldehyde Resin - -     29 Ethylenediamine Dihydrochloride - -      Parabens      70 30 Butyl-P-Hydroxybenzoate - -     31 Ethyl-P-Hydroxybenzoate - -     32 Methyl-P-Hydroxybenzoate - -    73 33 Propyl-P-Hydroxybenzoate - -     EMULSIFIERS & ADDITIVES       # Substance 2 days 4 days remarks   74 1 Polyethylene Glycol-400 - -    75 2 Cocamidopropyl Betaine - -     3 Amerchol L101 - -     4 Propylene Glycol - -     5 Triethanolamine - -     6 Sorbitane Sesquiolate CT - -    80 7 Isopropylmyristate - -     8 Polysorbate 80 CT - -     9 Amidoamine   (Stearamidopropyl Dimethylamine) - -     10 Oleamidopropyl Dimethylamine - -     11 Lauryl Glucoside - -    85 12 Coconut Diethanolamide  - -     13 2-Hydroxy-4-Methoxy Benzophenone (Oxybenzone) - -     14 Benzophenone-4 (Sulisobenzon) - -     15  Propolis - -     16 Dexpanthenol - -    90 17 Abitol - -     18 Tert-Butylhydroquinone - -     19 Benzyl Salicylate - -     20 Dimethylaminopropylamin (DMPA) - -     21 Zinc Pyrithione (Zinc Omadine)  - -    95 22 Se(Hydroxymethyl) Nitromethane  - -      Antioxidant       23 Dodecyl Gallate - -     24 Butylhydroxyanisole (BHA) - -     25 Butylhydroxytoluene (BHT) - -     26 Di-Alpha-Tocopherol (Vit E) - -    100 27 Propyl Gallate - -     PERFUMES, FLAVORS & PLANTS        # Substance 2 days 4 days remarks   101 1 Benzyl Cinnamate - -     2 Di-Limonene (Dipentene) - -     3 Cananga Odorata (Oleg Dejesus) (I) - -     4 Lichen Acid Mix - -    105 5 Mentha Piperita Oil (Peppermint Oil) - -     6 Sesquiterpenelactone mix - -     7 Tea Tree Oil, Oxidized - -     8 Wood Tar Mix - -     9 Abietic Acid - -    110 10 Lavendula Angustifolia Oil (Lavender Oil) - -     11 Fragrance mix II CT * 14% - -      Fragrance Mix I       12 Oakmoss Absolute - -     13 Eugenol - -     14 Geraniol - -    115 15 Hydroxycitronellal - -     16 Isoeugenol - -     17 Cinnamic Aldehyde - -     18 Cinnamic Alcohol  - -      Fragrance mix II       19 Citronellol - -    120 20 Alpha-Hexylcinnamic Aldehyde    - -     21 Citral - -     22 Farnesol - -    123 23 Coumarin - -    Hexylcinnamic aldehyde, Coumarin, Farnesol, Hydroxyisohexy3-cyclohexene carboxaldehyde, citral, citrolellol   RUBBER CHEMICALS        # Substance 2 days 4 days remarks     Carbamate      124 1 Zinc Bis ( Diethyldithio carbamate ) (ZDEC) - -    125 2 Zinc Bis (Dibutyldithiocarbamate) - -     3 1,3-Diphenylguanidine (DPG) - -      Thiourea       4 Dibutylthiourea - -     5 Diphenyltiourea - -     6 Thiourea - -      Mercapto chemicals      130 7 Morpholinyl Mercaptobenzothiazole - -     8 E-Xllghcwrpa-3-Benzothiazyl-Sulfenamide - -     9 Dibenzothiazyl Disulfide - -      Thiuram chemicals       10 Dipentamethylenethiuram Disulfide - -     11 Tetraethylthiuram Disulfide  (Disulfiram) - -    135 12 Tetramethylthiuram Disulfide - -     13 Tetramethyl Thiuram Monosulfide (TMTM) - -      4-Phenylenediamine derivatives       14 N-Isopropyl-N'-Phenyl-P-Phenylenediamine (IPPD) - -     15 Vottlzrw-Q-Tpseiesnfiiwhkly (DPPD) - -      Various Rubber Additives       16 Hydroquinone Monobenzylether  - -    140 17 Hexamethylenetetramine - -     18 4,4'-Dihydroxybiphenyl - -     19 Cyclohexylthiophtalimide - -    143 20 N-Phenyl-B-Naphthylamine - -     PLASTICS (Acrylates/Epoxy Systems)       # Substance 2 days 4 days remarks     Acrylates - -    144 1 2-Hydroxyethyl Methacrylate (HEMA) - -    145 2 1,4-Butandioldimethacrylate (BUDMA) - -     3  2-Ethylhexyl Acrylate - -     4 Bisphenol-A-Dimethacrylate  - -     5 Diurethane-Dimethacrylate - -     6 Ethyleneglycoldimethacrylate (EGDMA) - -    150 7 Pentaerythritoltriacrylate (ESTEFANI) - -     8 Triethylene Glycol Dimethacrylate (TEGDMA) - -      Synthetic material/additives        9 K-Brrh-Uwmbwptgakk - -     10 Tricresyl Phosphate - -     11 1-Qpog-Fmlippkeyalwq - -    155 12 Dioctyl phtalate(DEHP,DOP) / (Dimethylhexylphthalate)  - -     13 Dibutylphthalate - -     14 Dimethylphthalate - -     15 Toluene-2,4-Diisocyanate NA NA     16 Diphenylmethane-4,4''-Diisocyanate NA NA      EPOXY RESIN SYSTEMS        Reactive Solvents - -    160 17 Cresyl Glycidyl Ether NA NA     18 Butyl Glycidyl Ether - -     19 Phenyl Glycidyl Ether - -     20 1,4-Butanediol Diglycidyl Ether - -     21 1,6-Hexanediole Diglycidyl Ether - -      Hardener / Accelerator - -    165 22 Triethylenetetramine - -     23 Diethylenetriamine - -     24 Isophorone Diamine (IPD) - -     25 N,N-Dimethyl-P-Toluidine - -    169 26 Isobornyl Acrylate - -     OTHER PRODUCTS   stoma adhesive and stoma paste       # Substance Conc  % solv 2 days 4 days remarks   170 1 Cavilon barrier film As is        2 Aretha stoma cleanser As is        3 Stoma bag adhesive As is       173 4 Barrier strip  "adhesive As is         Patients own products:  è DO NOT test if chemical or biological identity is unknown!   - always ask from patient the product information and safety sheets and consult with the physician   - check neutral pH with pH indicator paper (do not test pH below 5 or over 8 or consult with physician)  - leave-on cosmetics can be tested \"as is\"  - rinse-off products have to be diluted with water, buffer, olive oil or paraffin (discuss with physician)  à remember:   - non-specific toxic/corrosive or biological reactions can occur  - tests with patients own products are not standardized and test conditions are not optimized for patch tests. Whenever possible test with standardized test series and correlate use of product with the result of the patch tests  - if not sure if compounds can be tested under occlusion in patch tests consider open application tests     Results of patch tests:                         Interpretation:  - Negative                    A    = Allergic      (+) Erythema    TI   = Toxic/irritant   + E + Infiltration    RaP = Relevance at Present     ++ E/I + Papulovesicle   Rpr  = Relevance Previously     +++ E/I/P + Blister     nR   = No Relevance    Atopy Screen (Placed Oct 2, 2023)  No Substance Readings (15 min) Evaluation   POS Histamine 1mg/ml +/++    NEG NaCl 0.9% -      No Substance Readings (15 min) Evaluation   1 Alternaria alternata (tenuis)  -    2 Cladosporium herbarum -    3 Aspergillus fumigatus -    4 Penicillium notatum -    5 Dermatophagoides pteronyssinus -    6 Dermatophagoides farinae -    7 Dog epithelium (canis spp) -    8 Cat hair (francia catus) -    9 Cockroach   (Blatella americana & germanica) -    10 Grass mix midwest   (Marianne, Orchard, Redtop, Wilber) -    11 Daniel grass (sorghum halepense) -    12 Weed mix   (common Cocklebur, Lamb s quarters, rough redroot Pigweed, Dock/Sorrel) -    13 Mug wort (artemisia vulgare) -    14 Ragweed giant/short (ambrosia spp) " -    15 White birch (Betula papyrifera) -    16 Tree mix 1 (Pecan, Maple BHR, Oak RVW, american Beulah, black Arnegard) -    17 Red cedar (juniperus virginia) -    18 Tree mix 2   (white Joey, river/red Birch, black Stanley, common Adrian, american Elm) -    19 Box elder/Maple mix (acer spp) -    20 Gould shagbark (carya ovata) -           Conclusion: prick tests negatives, no signs for atopy     [] No relevant allergic reaction observed    [] Allergic reaction diagnosed against following allergens:      Interpretation/ remarks:   See later    [] Patient information given   [] ACDS CAMP information's (# ....) to following compounds: .....   [] General information's to following compounds: ......      Assessment & Plan:    ==> Final Diagnosis:     # around the stoma severe, recurrent dermatitis  DDx allergic contact dermatitis to stoma adhesives or disinfectants  DDx irritant/microbial dermatitis with atopy  * undiagnosed new problem with uncertain prognosis   Patient developed a sudden onset, erythematous rash surrounding her stoma site about a year ago. Stoma related to SBO requiring emergent surgical intervention. Since that time, she has tried nystatin, topical antibiotics, oral antibiotics with no relief. No changes to stoma supplies. At this time, her dermatitis is improved from prior as she has simplified her cleansing regimen. At this time I am most suspicious for a contact dermatitis related to cleansing supplies vs adhesive. Antimicrobial is possible, however seems less likely given treatment attempts. Irritant from bowel contents also unlikely given her good stoma cares and minimal leakage.   - stop using previously used disinfectants   - start gentain violet for cleansing   - mometasone solution twice weekly   - allergy testing will be scheduled as above     # suspicion for atopic predisposition with  Prior prick tests positive to pine pollen  Prick tests negatives!  * stable chronic illness    These  conclusions are made at the best of one's knowledge and belief based on the provided evidence such as patient's history and allergy test results and they can change over time or can be incomplete because of missing information's.    ==> Treatment Plan:  - plan for testing as above   - will be placed on priority list      Procedures Performed: Allergy tests, including prick, intradermal and patch tests and drug allergy or provocation tests***    [id number]}: provider    Follow-up in Derm-Allergy clinic for 1st readings of patch tests after 2 days and 2nd readings and final conclusions after 4 days   ___________________________    I spent a total of *** minutes with Estrella Salas during today s  visit. This time was spent discussing all the individual test results, correlating them to the clinical relevance, counseling the patient and/or coordinating care and performing allergy tests such as ..... or procedures.           Again, thank you for allowing me to participate in the care of your patient.        Sincerely,        Zak Block MD

## 2023-10-04 ENCOUNTER — OFFICE VISIT (OUTPATIENT)
Dept: ALLERGY | Facility: CLINIC | Age: 68
End: 2023-10-04
Payer: MEDICARE

## 2023-10-04 DIAGNOSIS — Z88.9 ATOPY: ICD-10-CM

## 2023-10-04 DIAGNOSIS — L23.5 ALLERGIC DERMATITIS DUE TO OTHER CHEMICAL PRODUCT: Primary | ICD-10-CM

## 2023-10-04 PROCEDURE — 99207 PR NO CHARGE LOS: CPT | Performed by: DERMATOLOGY

## 2023-10-04 NOTE — NURSING NOTE
Chief Complaint   Patient presents with    Allergy Testing Followup     Patch day 3     Jace Goyal RN

## 2023-10-04 NOTE — PROGRESS NOTES
[facility] Dermato-allergology Note  Office visit  Encounter Date: Oct 4, 2023  ____________________________________________    CC: No chief complaint on file.      HPI:  (Oct 4, 2023)  Ms. Estrella Salas is a(n) 68 year old female who presents today as a return patient for allergy consultation  - Follow-up in Derm-Allergy clinic for 1st readings of patch tests after 2 days   - otherwise feeling well in usual state of health    Physical exam:  General: In no acute distress, well-developed, well-nourished  Eyes: no conjunctivitis  ENT: no signs of rhinitis   Pulmonary: no wheezing or coughing  Skin:Focused examination of the skin on test sites was performed = see test results below    Earlier History and Allergy exams:  (Oct 2, 2023)  - Follow-up in Derm-Allergy clinic, as scheduled     Earlier History and Allergy exams:  (Jul 25, 2023)  She was referred patch testing related to possible allergic contact dermatitis around her ostomy site/stoma which has been going on for about a year   About a year ago, developed a rash around her stoma that came up suddenly, intensely pruritic, and she tried using antibiotic ointment , nystatin and stoma powder, oral antibiotic course which provided no relief. Had not tried changing the stoma supplies.   Has been having reactions to band-aid, has had crusting and irritation to the ear with wearing earrings however this has cleared up   Sulfa allergy - 2/2023 reported that liver enzymes were elevated with his, no skin eruption or anaphylaxis. No other reactions to medications.   Has had 2 reactions to tocilizumab and abatacept, developed a deeper itching sensation, no prior diagnosis of eczema although had a rash to her scalp which was quite itchy with no visible rash. During tocilizumab, at site of infusion, urticarial reaction and hives on neck.   - well demarcated area of erythematous plaque surrounding ostomy site, improved from prior based on photos            Past Medical History:   There is no problem list on file for this patient.    No past medical history on file.    Allergies:  Allergies   Allergen Reactions    Sulfa Antibiotics Other (See Comments)     States liver studies went very high when on sulfa drugs.    Abatacept Itching     With subcutaneous and IV preparations, no rash.    Adhesive Tape Rash    Tocilizumab Rash       Medications:  Current Outpatient Medications   Medication    acetaminophen (TYLENOL) 500 MG tablet    albuterol (PROAIR HFA/PROVENTIL HFA/VENTOLIN HFA) 108 (90 Base) MCG/ACT inhaler    clobetasol (TEMOVATE) 0.05 % external ointment    cyclobenzaprine (FLEXERIL) 10 MG tablet    diclofenac (VOLTAREN) 1 % topical gel    DULoxetine (CYMBALTA) 20 MG capsule    estradiol (ESTRACE) 0.1 MG/GM vaginal cream    famotidine (PEPCID) 20 MG tablet    fish oil-omega-3 fatty acids 1000 MG capsule    fluconazole (DIFLUCAN) 150 MG tablet    gabapentin (NEURONTIN) 300 MG capsule    gentian violet 1 % external solution    hydrocortisone (CORTAID) 1 % external cream    hydroxychloroquine (PLAQUENIL) 200 MG tablet    isosorbide mononitrate (IMDUR) 60 MG 24 hr tablet    LINZESS 145 MCG capsule    Magnesium Citrate 100 MG TABS    metoprolol tartrate (LOPRESSOR) 25 MG tablet    mometasone (ELOCON) 0.1 % external solution    nitroGLYcerin (NITROSTAT) 0.4 MG sublingual tablet    ondansetron (ZOFRAN ODT) 4 MG ODT tab    polyethylene glycol 3350 POWD    Probiotic Product (MISC INTESTINAL FERNANDO REGULAT) CAPS    RABEprazole (ACIPHEX) 20 MG EC tablet    rOPINIRole (REQUIP ER) 2 MG 24 hr tablet    rosuvastatin (CRESTOR) 40 MG tablet    sulfaSALAzine (AZULFIDINE) 500 MG tablet    triamcinolone (KENALOG) 0.1 % external cream    vitamin B-12 (CYANOCOBALAMIN) 1000 MCG tablet     No current facility-administered medications for this visit.       Social History:  The patient is retired. Formally an . Patient has the following hobbies or non-occupational  exposure, loves to sew, has twin grandchildren. No recent travel or exposures.     Family History:  No family history on file.    Previous Labs, Allergy Tests, Dermatopathology, Imaging:  Previous prick testing - positive for pine allergy, no other allergies at that time     Referred By: Sandi Flynn MD  DERMATOLOGY [address]  [place] [address]     Allergy Tests:    Past Allergy Test- past testing about 10 years ago showed allergy to pine needles     Order for Future Allergy Testing:    [x] Outpatient  [] Inpatient: Monae..../ Bed ....       Skin Atopy (atopic dermatitis) [x] Yes   [] No .........  Contact allergies:   [x] Yes   [] No ..........  Hand eczema:   [] Yes   [x] No           Leading hand:   [] R   [] L       [] Ambidextrous         Drug allergies:        [x] Yes   [] No  which?....Sulfa - LFT rising, biologics are in question..    Urticaria/Angioedema  [] Yes   [x] No .........  Food Allergy:  [] Yes   [x] No  which?......  Pets :  [] Yes   [x] No  which?......         []  Rhinitis   [] Conjunctivitis   [x] Sinusitis   [] Polyposis   [] Otitis   [] Pharyngitis         [x]  Postnasal drip    []  none  Operations:   [] Tonsils   [] Septum   [] Sinus   [] Polyposis        [] Asthma bronchiale   [] Coughing      [x]  none  Symptoms (mostly Rhinoconjunctivitis and Asthma) aggravated by:  Season   [] I   [] II   [] III   [] IV   []V   []VI   []VII   []VIII   []IX   [x]X   [x]XI   [x]XII     [] perennial   Day time      [] morning   [] noon      [] evening        [] night    [] whole day........  [x]  none  Location/changes    [] inside        [] outside   [] mountains    [] sea     [] others.............   [x]  none  Triggers, specific     [] animals     [] plants     [] dust              [x] others ...........................    []  none  Triggers, others       [] work          [] psyche    [] sport            [] others .............................  [x]  none  Irritant                 [] phys efforts [] smoke    [] heat/cold     [] odors  []others............... [x]  none    Order for PATCH TESTS  Reason for tests (suspected allergy): allergic contact dermatitis to stoma products  Known previous allergies: pine   Standardized panels  [x] Standard panel (40 tests)  [x] Preservatives & Antimicrobials (31 tests)  [x] Emulsifiers & Additives (25 tests)   [x] Perfumes/Flavours & Plants (25 tests)  [] Hairdresser panel (12 tests)  [x] Rubber Chemicals (22 tests)  [x] Plastics (26 tests)  [] Colorants/Dyes/Food additives (20 tests)  [] Metals (implants/dental) (24 tests)  [] Local anaesthetics/NSAIDs (13 tests)  [] Antibiotics & Antimycotics (14 tests)   [] Corticosteroids (15 tests)   [] Photopatch test (62 tests)   [] others: ...      [x] Patient's own products: stoma adhesive and stoma paste  DO NOT test if chemical or biological identity is unknown!   always ask from patient the product information and safety sheets (MSDS)       Order for PRICK TESTS    Reason for tests (suspected allergy): atopy?  Known previous allergies: previous tests positive to pine    Standardized prick panels  [x] Atopic panel (20 tests)  [] Pediatric Panel (12 tests)  [] Milk, Meat, Eggs, Grains (20 tests)   [] Dust, Epithelia, Feathers (10 tests)  [] Fish, Seafood, Shellfish (17 tests)  [] Nuts, Beans (14 tests)  [] Spice, Vegetable, Fruit (17 tests)  [] Pollen Panel = Tree, Grass, Weed (24 tests)  [] Others: ...      [] Patient's own products: ...  DO NOT test if chemical or biological identity is unknown!   always ask from patient the product information and safety sheets (MSDS)     Standardized intradermal tests  [] Penicillium notatum [] Aspergillus fumigatus [] House dust mites D.far & D. pteron  [] Cat    [] dog  [] Others: ...  [] Bee venom   [] Wasp venom  !!Specific protocol with dilutions!!       Order for Drug allergy tests (prick & Intradermal & patch tests)    [] Penicillin G  [] Ampicillin [] Cefazolin    [] Ceftriaxone   [] Ceftazidime  [] Bactrim    [] Others: ...  Order for ... as test date  ________________________________  RESULTS & EVALUATION of PATCH TESTS    Oct 2, 2023 application of patch tests:    Patch test readings after     [x] 2 days, [] 3 days [x] 4 days, [] 5 days,  Other duration: ...    STANDARD Series                                          # Substance 2 days 4 days remarks     1 Adam Mix [C] - -       2 Colophony - -       3  2-Mercaptobenzothiazole  - -       4 Methylisothiazolinone - -       5 Carba Mix - -       6 Thiuram Mix [A] - -       7 Bisphenol A Epoxy Resin - -       8 D-Hszk-Jojcllyxbrm-Formaldehyde Resin - -       9 Mercapto Mix [A] - -       10 Black Rubber Mix- PPD [B] - -       11 Potassium Dichromate  -  -       12 Balsam of [place] (Myroxylon Pereirae Resin) - -       13 Nickel Sulphate Hexahydrate - -       14 Mixed Dialkyl Thiourea - -       15 Paraben Mix [B] - -       16 Methyldibromo Glutaronitrile - -       17 Fragrance Mix 8% - -       18 2-Bromo-2-Nitropropane-1,3-Diol (Bronopol) CT - -       19 Lyral - -       20 Tixocortol-21- Pivalate CT - -       21 Diazolidinyl urea (Germall II) - -        22 Methyl Methacrylate - -       23 Cobalt (II) Chloride Hexahydrate - -       24 Fragrance Mix II  - -       25 Compositae Mix - -       26 Benzoyl Peroxide - -       27 Bacitracin - -       28 Formaldehyde - -       29 Methylchloroisothiazolinone / Methylisothiazolinone - -       30 Corticosteroid Mix CT - -       31 Sodium Lauryl Sulfate - -       32 Lanolin Alcohol - -       33 Turpentine - -       34 Cetylstearylalcohol - -       35 Chlorhexidine Dicluconate - -       36 Budesonide - -       37 Imidazolidinyl Urea  - -       38 Ethyl-2 Cyanoacrylate - -       39 Quaternium 15 (Dowicil 200) - -       40 Decyl Glucoside - -      PRESERVATIVES & ANTIMICROBIALS        # Substance 2 days 4 days remarks   41 1  1,2-Benzisothiazoline-3-One, Sodium Salt - -     2  1,3,5-Se  (2-Hydroxyethyl) - Hexahydrotriazine (Grotan BK) - -     3 2-Indrmjlyruudw-3-Nitro-1, 3-Propanediol - -     4  3, 4, 4' - Triclocarban - -    45 5 4 - Chloro - 3 - Cresol - -     6 4 - Chloro - 4 - Xylenol (PCMX) - -     7 7-Ethylbicyclooxazolidine (Bioban WA4871) - -     8 Benzalkonium Chloride CT - -     9 Benzyl Alcohol - -    50 10 Cetalkonium Chloride - -     11 Cetylpyrimidine Chloride  - -     12 Chloroacetamide - -     13 DMDM Hydantoin - -     14 Glutaraldehyde - -    55 15 Triclosan - -     16 Glyoxal Trimeric Dihydrate - -     17 Iodopropynyl Butylcarbamate - -     18 Octylisothiazoline - -     19 Bithionol CT NA NA    60 20 Bioban P 1487 (Nitrobutyl) Morpholine/(Ethylnitro-Trimethylene) Dimorpholine - -     21 Phenoxyethanol - -     22 Phenyl Salicylate - -     23 Povidone Iodine - -     24 Sodium Benzoate - -    65 25 Sodium Disulfite - -     26 Sorbic Acid - -     27 Thimerosal - -     28 Melamine Formaldehyde Resin - -     29 Ethylenediamine Dihydrochloride - -      Parabens      70 30 Butyl-P-Hydroxybenzoate - -     31 Ethyl-P-Hydroxybenzoate - -     32 Methyl-P-Hydroxybenzoate - -    73 33 Propyl-P-Hydroxybenzoate - -     EMULSIFIERS & ADDITIVES       # Substance 2 days 4 days remarks   74 1 Polyethylene Glycol-400 - -    75 2 Cocamidopropyl Betaine - -     3 Amerchol L101 - -     4 Propylene Glycol - -     5 Triethanolamine - -     6 Sorbitane Sesquiolate CT - -    80 7 Isopropylmyristate - -     8 Polysorbate 80 CT - -     9 Amidoamine   (Stearamidopropyl Dimethylamine) - -     10 Oleamidopropyl Dimethylamine - -     11 Lauryl Glucoside - -    85 12 Coconut Diethanolamide  - -     13 2-Hydroxy-4-Methoxy Benzophenone (Oxybenzone) - -     14 Benzophenone-4 (Sulisobenzon) - -     15 Propolis - -     16 Dexpanthenol - -    90 17 Abitol - -     18 Tert-Butylhydroquinone - -     19 Benzyl Salicylate - -     20 Dimethylaminopropylamin (DMPA) - -     21 Zinc Pyrithione (Zinc Omadine)  - -    95 22  Se(Hydroxymethyl) Nitromethane  - -      Antioxidant       23 Dodecyl Gallate - -     24 Butylhydroxyanisole (BHA) - -     25 Butylhydroxytoluene (BHT) - -     26 Di-Alpha-Tocopherol (Vit E) - -    100 27 Propyl Gallate - -     PERFUMES, FLAVORS & PLANTS        # Substance 2 days 4 days remarks   101 1 Benzyl Cinnamate - -     2 Di-Limonene (Dipentene) - -     3 Cananga Odorata (Oleg Dejesus) (I) - -     4 Lichen Acid Mix - -    105 5 Mentha Piperita Oil (Peppermint Oil) - -     6 Sesquiterpenelactone mix - -     7 Tea Tree Oil, Oxidized - -     8 Wood Tar Mix - -     9 Abietic Acid - -    110 10 Lavendula Angustifolia Oil (Lavender Oil) - -     11 Fragrance mix II CT * 14% - -      Fragrance Mix I       12 Oakmoss Absolute - -     13 Eugenol - -     14 Geraniol - -    115 15 Hydroxycitronellal (+) -     16 Isoeugenol - -     17 Cinnamic Aldehyde - -     18 Cinnamic Alcohol  - -      Fragrance mix II       19 Citronellol - -    120 20 Alpha-Hexylcinnamic Aldehyde    - -     21 Citral - -     22 Farnesol - -    123 23 Coumarin - -    Hexylcinnamic aldehyde, Coumarin, Farnesol, Hydroxyisohexy3-cyclohexene carboxaldehyde, citral, citrolellol   RUBBER CHEMICALS        # Substance 2 days 4 days remarks     Carbamate      124 1 Zinc Bis ( Diethyldithio carbamate ) (ZDEC) - -    125 2 Zinc Bis (Dibutyldithiocarbamate) - -     3 1,3-Diphenylguanidine (DPG) - -      Thiourea       4 Dibutylthiourea - -     5 Diphenyltiourea - -     6 Thiourea - -      Mercapto chemicals      130 7 Morpholinyl Mercaptobenzothiazole - -     8 M-Memioyfyxr-0-Benzothiazyl-Sulfenamide - -     9 Dibenzothiazyl Disulfide - -      Thiuram chemicals       10 Dipentamethylenethiuram Disulfide - -     11 Tetraethylthiuram Disulfide (Disulfiram) - -    135 12 Tetramethylthiuram Disulfide - -     13 Tetramethyl Thiuram Monosulfide (TMTM) - -      4-Phenylenediamine derivatives       14 N-Isopropyl-N'-Phenyl-P-Phenylenediamine (IPPD) - -     15  Vmxohsst-T-Uwennswltyfakntp (DPPD) - -      Various Rubber Additives       16 Hydroquinone Monobenzylether  - -    140 17 Hexamethylenetetramine - -     18 4,4'-Dihydroxybiphenyl - -     19 Cyclohexylthiophtalimide - -    143 20 N-Phenyl-B-Naphthylamine - -     PLASTICS (Acrylates/Epoxy Systems)       # Substance 2 days 4 days remarks     Acrylates - -    144 1 2-Hydroxyethyl Methacrylate (HEMA) - -    145 2 1,4-Butandioldimethacrylate (BUDMA) - -     3  2-Ethylhexyl Acrylate - -     4 Bisphenol-A-Dimethacrylate  - -     5 Diurethane-Dimethacrylate - -     6 Ethyleneglycoldimethacrylate (EGDMA) - -    150 7 Pentaerythritoltriacrylate (ESTEFANI) - -     8 Triethylene Glycol Dimethacrylate (TEGDMA) - -      Synthetic material/additives        9 M-Qstw-Wwtacxtnelr - -     10 Tricresyl Phosphate - -     11 2-Huvj-Nminsgmgbntgw - -    155 12 Dioctyl phtalate(DEHP,DOP) / (Dimethylhexylphthalate)  - -     13 Dibutylphthalate - -     14 Dimethylphthalate - -     15 Toluene-2,4-Diisocyanate NA NA     16 Diphenylmethane-4,4''-Diisocyanate NA NA      EPOXY RESIN SYSTEMS        Reactive Solvents - -    160 17 Cresyl Glycidyl Ether NA NA     18 Butyl Glycidyl Ether - -     19 Phenyl Glycidyl Ether - -     20 1,4-Butanediol Diglycidyl Ether - -     21 1,6-Hexanediole Diglycidyl Ether - -      Hardener / Accelerator - -    165 22 Triethylenetetramine - -     23 Diethylenetriamine - -     24 Isophorone Diamine (IPD) - -     25 N,N-Dimethyl-P-Toluidine - -    169 26 Isobornyl Acrylate - -     OTHER PRODUCTS   stoma adhesive and stoma paste       # Substance Conc  % solv 2 days 4 days remarks   170 1 Cavilon barrier film As is        2 Aretha stoma cleanser As is        3 Stoma bag adhesive As is       173 4 Barrier strip adhesive As is            Results of patch tests:                         Interpretation:  - Negative                    A    = Allergic      (+) Erythema    TI   = Toxic/irritant   + E + Infiltration    RaP = Relevance  at Present     ++ E/I + Papulovesicle   Rpr  = Relevance Previously     +++ E/I/P + Blister     nR   = No Relevance    Atopy Screen (Placed Oct 2, 2023)  No Substance Readings (15 min) Evaluation   POS Histamine 1mg/ml +/++    NEG NaCl 0.9% -      No Substance Readings (15 min) Evaluation   1 Alternaria alternata (tenuis)  -    2 Cladosporium herbarum -    3 Aspergillus fumigatus -    4 Penicillium notatum -    5 Dermatophagoides pteronyssinus -    6 Dermatophagoides farinae -    7 Dog epithelium (canis spp) -    8 Cat hair (francai catus) -    9 Cockroach   (Blatella americana & germanica) -    10 Grass mix midwest   (Marianne, Orchard, Redtop, Wilber) -    11 Daniel grass (sorghum halepense) -    12 Weed mix   (common Cocklebur, Lamb s quarters, rough redroot Pigweed, Dock/Sorrel) -    13 Mug wort (artemisia vulgare) -    14 Ragweed giant/short (ambrosia spp) -    15 White birch (Betula papyrifera) -    16 Tree mix 1 (Pecan, Maple BHR, Oak RVW, american [place], black [place]) -    17 Red cedar (juniperus virginia) -    18 Tree mix 2   (white Joey, river/red Birch, black [place], common White, american Elm) -    19 Box elder/Maple mix (acer spp) -    20 Pray shagbark (carya ovata) -           Conclusion: prick tests negatives, no signs for atopy. Maybe some delayed reaction to ragweed prick     [x] No relevant allergic reaction observed    [] Allergic reaction diagnosed against following allergens:      Interpretation/ remarks:   See later    [] Patient information given   [] ACDS CAMP information's (# ....) to following compounds: .....   [] General information's to following compounds: ......      Assessment & Plan:    ==> Final Diagnosis:     # around the stoma severe, recurrent dermatitis  DDx allergic contact dermatitis to stoma adhesives or disinfectants  DDx irritant/microbial dermatitis with atopy  * undiagnosed new problem with uncertain prognosis   Patient developed a sudden onset, erythematous rash  surrounding her stoma site about a year ago. Stoma related to SBO requiring emergent surgical intervention. Since that time, she has tried nystatin, topical antibiotics, oral antibiotics with no relief. No changes to stoma supplies. At this time, her dermatitis is improved from prior as she has simplified her cleansing regimen. At this time I am most suspicious for a contact dermatitis related to cleansing supplies vs adhesive. Antimicrobial is possible, however seems less likely given treatment attempts. Irritant from bowel contents also unlikely given her good stoma cares and minimal leakage.   - stop using previously used disinfectants   - start gentain violet for cleansing   - mometasone solution twice weekly   - allergy testing will be scheduled as above     # suspicion for atopic predisposition with  Prior prick tests positive to pine pollen  Prick tests negatives!  * stable chronic illness    These conclusions are made at the best of one's knowledge and belief based on the provided evidence such as patient's history and allergy test results and they can change over time or can be incomplete because of missing information's.    ==> Treatment Plan:  - see later  ___________________________    Staff: : provider    Follow-up in Derm-Allergy clinic for 2nd readings and final conclusions after 4 days   ___________________________    I spent a total of 16 minutes with Estrella Salas during today s  visit. This time was spent discussing all the individual test results, correlating them to the clinical relevance, counseling the patient and/or coordinating care

## 2023-10-04 NOTE — LETTER
10/4/2023         RE: Estrella Salas  [address]  [place] [facility] [address]        Dear Colleague,    Thank you for referring your patient, Estrella Salas, to the [facility]. Please see a copy of my visit note below.    [facility] Dermato-allergology Note  Office visit  Encounter Date: Oct 4, 2023  ____________________________________________    CC: No chief complaint on file.      HPI:  (Oct 4, 2023)  Ms. Estrella Salas is a(n) 68 year old female who presents today as a return patient for allergy consultation  - Follow-up in Derm-Allergy clinic for 1st readings of patch tests after 2 days   - otherwise feeling well in usual state of health    Physical exam:  General: In no acute distress, well-developed, well-nourished  Eyes: no conjunctivitis  ENT: no signs of rhinitis   Pulmonary: no wheezing or coughing  Skin:Focused examination of the skin on test sites was performed = see test results below    Earlier History and Allergy exams:  (Oct 2, 2023)  - Follow-up in Derm-Allergy clinic, as scheduled     Earlier History and Allergy exams:  (Jul 25, 2023)  She was referred patch testing related to possible allergic contact dermatitis around her ostomy site/stoma which has been going on for about a year   About a year ago, developed a rash around her stoma that came up suddenly, intensely pruritic, and she tried using antibiotic ointment , nystatin and stoma powder, oral antibiotic course which provided no relief. Had not tried changing the stoma supplies.   Has been having reactions to band-aid, has had crusting and irritation to the ear with wearing earrings however this has cleared up   Sulfa allergy - 2/2023 reported that liver enzymes were elevated with his, no skin eruption or anaphylaxis. No other reactions to medications.   Has had 2 reactions to tocilizumab and abatacept, developed a deeper itching sensation, no prior diagnosis of eczema although had a rash  to her scalp which was quite itchy with no visible rash. During tocilizumab, at site of infusion, urticarial reaction and hives on neck.   - well demarcated area of erythematous plaque surrounding ostomy site, improved from prior based on photos           Past Medical History:   There is no problem list on file for this patient.    No past medical history on file.    Allergies:  Allergies   Allergen Reactions     Sulfa Antibiotics Other (See Comments)     States liver studies went very high when on sulfa drugs.     Abatacept Itching     With subcutaneous and IV preparations, no rash.     Adhesive Tape Rash     Tocilizumab Rash       Medications:  Current Outpatient Medications   Medication     acetaminophen (TYLENOL) 500 MG tablet     albuterol (PROAIR HFA/PROVENTIL HFA/VENTOLIN HFA) 108 (90 Base) MCG/ACT inhaler     clobetasol (TEMOVATE) 0.05 % external ointment     cyclobenzaprine (FLEXERIL) 10 MG tablet     diclofenac (VOLTAREN) 1 % topical gel     DULoxetine (CYMBALTA) 20 MG capsule     estradiol (ESTRACE) 0.1 MG/GM vaginal cream     famotidine (PEPCID) 20 MG tablet     fish oil-omega-3 fatty acids 1000 MG capsule     fluconazole (DIFLUCAN) 150 MG tablet     gabapentin (NEURONTIN) 300 MG capsule     gentian violet 1 % external solution     hydrocortisone (CORTAID) 1 % external cream     hydroxychloroquine (PLAQUENIL) 200 MG tablet     isosorbide mononitrate (IMDUR) 60 MG 24 hr tablet     LINZESS 145 MCG capsule     Magnesium Citrate 100 MG TABS     metoprolol tartrate (LOPRESSOR) 25 MG tablet     mometasone (ELOCON) 0.1 % external solution     nitroGLYcerin (NITROSTAT) 0.4 MG sublingual tablet     ondansetron (ZOFRAN ODT) 4 MG ODT tab     polyethylene glycol 3350 POWD     Probiotic Product (MISC INTESTINAL FERNANDO REGULAT) CAPS     RABEprazole (ACIPHEX) 20 MG EC tablet     rOPINIRole (REQUIP ER) 2 MG 24 hr tablet     rosuvastatin (CRESTOR) 40 MG tablet     sulfaSALAzine (AZULFIDINE) 500 MG tablet     triamcinolone  (KENALOG) 0.1 % external cream     vitamin B-12 (CYANOCOBALAMIN) 1000 MCG tablet     No current facility-administered medications for this visit.       Social History:  The patient is retired. Formally an . Patient has the following hobbies or non-occupational exposure, loves to sew, has twin grandchildren. No recent travel or exposures.     Family History:  No family history on file.    Previous Labs, Allergy Tests, Dermatopathology, Imaging:  Previous prick testing - positive for pine allergy, no other allergies at that time     Referred By: Sandi Flynn MD  DERMATOLOGY [address] [address]     Allergy Tests:    Past Allergy Test- past testing about 10 years ago showed allergy to pine needles     Order for Future Allergy Testing:    [x] Outpatient  [] Inpatient: Monae..../ Bed ....       Skin Atopy (atopic dermatitis) [x] Yes   [] No .........  Contact allergies:   [x] Yes   [] No ..........  Hand eczema:   [] Yes   [x] No           Leading hand:   [] R   [] L       [] Ambidextrous         Drug allergies:        [x] Yes   [] No  which?....Sulfa - LFT rising, biologics are in question..    Urticaria/Angioedema  [] Yes   [x] No .........  Food Allergy:  [] Yes   [x] No  which?......  Pets :  [] Yes   [x] No  which?......         []  Rhinitis   [] Conjunctivitis   [x] Sinusitis   [] Polyposis   [] Otitis   [] Pharyngitis         [x]  Postnasal drip    []  none  Operations:   [] Tonsils   [] Septum   [] Sinus   [] Polyposis        [] Asthma bronchiale   [] Coughing      [x]  none  Symptoms (mostly Rhinoconjunctivitis and Asthma) aggravated by:  Season   [] I   [] II   [] III   [] IV   []V   []VI   []VII   []VIII   []IX   [x]X   [x]XI   [x]XII     [] perennial   Day time      [] morning   [] noon      [] evening        [] night    [] whole day........  [x]  none  Location/changes    [] inside        [] outside   [] mountains    [] sea     [] others.............   [x]   none  Triggers, specific     [] animals     [] plants     [] dust              [x] others ...........................    []  none  Triggers, others       [] work          [] psyche    [] sport            [] others .............................  [x]  none  Irritant                [] phys efforts [] smoke    [] heat/cold     [] odors  []others............... [x]  none    Order for PATCH TESTS  Reason for tests (suspected allergy): allergic contact dermatitis to stoma products  Known previous allergies: pine   Standardized panels  [x] Standard panel (40 tests)  [x] Preservatives & Antimicrobials (31 tests)  [x] Emulsifiers & Additives (25 tests)   [x] Perfumes/Flavours & Plants (25 tests)  [] Hairdresser panel (12 tests)  [x] Rubber Chemicals (22 tests)  [x] Plastics (26 tests)  [] Colorants/Dyes/Food additives (20 tests)  [] Metals (implants/dental) (24 tests)  [] Local anaesthetics/NSAIDs (13 tests)  [] Antibiotics & Antimycotics (14 tests)   [] Corticosteroids (15 tests)   [] Photopatch test (62 tests)   [] others: ...      [x] Patient's own products: stoma adhesive and stoma paste  DO NOT test if chemical or biological identity is unknown!   always ask from patient the product information and safety sheets (MSDS)       Order for PRICK TESTS    Reason for tests (suspected allergy): atopy?  Known previous allergies: previous tests positive to pine    Standardized prick panels  [x] Atopic panel (20 tests)  [] Pediatric Panel (12 tests)  [] Milk, Meat, Eggs, Grains (20 tests)   [] Dust, Epithelia, Feathers (10 tests)  [] Fish, Seafood, Shellfish (17 tests)  [] Nuts, Beans (14 tests)  [] Spice, Vegetable, Fruit (17 tests)  [] Pollen Panel = Tree, Grass, Weed (24 tests)  [] Others: ...      [] Patient's own products: ...  DO NOT test if chemical or biological identity is unknown!   always ask from patient the product information and safety sheets (MSDS)     Standardized intradermal tests  [] Penicillium notatum []  Aspergillus fumigatus [] House dust mites D.far & D. pteron  [] Cat    [] dog  [] Others: ...  [] Bee venom   [] Wasp venom  !!Specific protocol with dilutions!!       Order for Drug allergy tests (prick & Intradermal & patch tests)    [] Penicillin G  [] Ampicillin [] Cefazolin   [] Ceftriaxone   [] Ceftazidime  [] Bactrim    [] Others: ...  Order for ... as test date  ________________________________  RESULTS & EVALUATION of PATCH TESTS    Oct 2, 2023 application of patch tests:    Patch test readings after     [x] 2 days, [] 3 days [x] 4 days, [] 5 days,  Other duration: ...    STANDARD Series                                          # Substance 2 days 4 days remarks     1 Adam Mix [C] - -       2 Colophony - -       3  2-Mercaptobenzothiazole  - -       4 Methylisothiazolinone - -       5 Carba Mix - -       6 Thiuram Mix [A] - -       7 Bisphenol A Epoxy Resin - -       8 K-Lsam-Eblgzywbyal-Formaldehyde Resin - -       9 Mercapto Mix [A] - -       10 Black Rubber Mix- PPD [B] - -       11 Potassium Dichromate  -  -       12 Balsam of [place] (Myroxylon Pereirae Resin) - -       13 Nickel Sulphate Hexahydrate - -       14 Mixed Dialkyl Thiourea - -       15 Paraben Mix [B] - -       16 Methyldibromo Glutaronitrile - -       17 Fragrance Mix 8% - -       18 2-Bromo-2-Nitropropane-1,3-Diol (Bronopol) CT - -       19 Lyral - -       20 Tixocortol-21- Pivalate CT - -       21 Diazolidinyl urea (Germall II) - -        22 Methyl Methacrylate - -       23 Cobalt (II) Chloride Hexahydrate - -       24 Fragrance Mix II  - -       25 Compositae Mix - -       26 Benzoyl Peroxide - -       27 Bacitracin - -       28 Formaldehyde - -       29 Methylchloroisothiazolinone / Methylisothiazolinone - -       30 Corticosteroid Mix CT - -       31 Sodium Lauryl Sulfate - -       32 Lanolin Alcohol - -       33 Turpentine - -       34 Cetylstearylalcohol - -       35 Chlorhexidine Dicluconate - -       36 Budesonide - -       37  Imidazolidinyl Urea  - -       38 Ethyl-2 Cyanoacrylate - -       39 Quaternium 15 (Dowicil 200) - -       40 Decyl Glucoside - -      PRESERVATIVES & ANTIMICROBIALS        # Substance 2 days 4 days remarks   41 1  1,2-Benzisothiazoline-3-One, Sodium Salt - -     2  1,3,5-Se (2-Hydroxyethyl) - Hexahydrotriazine (Grotan BK) - -     3 7-Vmlzmdzyiawmz-0-Nitro-1, 3-Propanediol - -     4  3, 4, 4' - Triclocarban - -    45 5 4 - Chloro - 3 - Cresol - -     6 4 - Chloro - 4 - Xylenol (PCMX) - -     7 7-Ethylbicyclooxazolidine (Bioban GL7238) - -     8 Benzalkonium Chloride CT - -     9 Benzyl Alcohol - -    50 10 Cetalkonium Chloride - -     11 Cetylpyrimidine Chloride  - -     12 Chloroacetamide - -     13 DMDM Hydantoin - -     14 Glutaraldehyde - -    55 15 Triclosan - -     16 Glyoxal Trimeric Dihydrate - -     17 Iodopropynyl Butylcarbamate - -     18 Octylisothiazoline - -     19 Bithionol CT NA NA    60 20 Bioban P 1487 (Nitrobutyl) Morpholine/(Ethylnitro-Trimethylene) Dimorpholine - -     21 Phenoxyethanol - -     22 Phenyl Salicylate - -     23 Povidone Iodine - -     24 Sodium Benzoate - -    65 25 Sodium Disulfite - -     26 Sorbic Acid - -     27 Thimerosal - -     28 Melamine Formaldehyde Resin - -     29 Ethylenediamine Dihydrochloride - -      Parabens      70 30 Butyl-P-Hydroxybenzoate - -     31 Ethyl-P-Hydroxybenzoate - -     32 Methyl-P-Hydroxybenzoate - -    73 33 Propyl-P-Hydroxybenzoate - -     EMULSIFIERS & ADDITIVES       # Substance 2 days 4 days remarks   74 1 Polyethylene Glycol-400 - -    75 2 Cocamidopropyl Betaine - -     3 Amerchol L101 - -     4 Propylene Glycol - -     5 Triethanolamine - -     6 Sorbitane Sesquiolate CT - -    80 7 Isopropylmyristate - -     8 Polysorbate 80 CT - -     9 Amidoamine   (Stearamidopropyl Dimethylamine) - -     10 Oleamidopropyl Dimethylamine - -     11 Lauryl Glucoside - -    85 12 Coconut Diethanolamide  - -     13 2-Hydroxy-4-Methoxy Benzophenone  (Oxybenzone) - -     14 Benzophenone-4 (Sulisobenzon) - -     15 Propolis - -     16 Dexpanthenol - -    90 17 Abitol - -     18 Tert-Butylhydroquinone - -     19 Benzyl Salicylate - -     20 Dimethylaminopropylamin (DMPA) - -     21 Zinc Pyrithione (Zinc Omadine)  - -    95 22 Se(Hydroxymethyl) Nitromethane  - -      Antioxidant       23 Dodecyl Gallate - -     24 Butylhydroxyanisole (BHA) - -     25 Butylhydroxytoluene (BHT) - -     26 Di-Alpha-Tocopherol (Vit E) - -    100 27 Propyl Gallate - -     PERFUMES, FLAVORS & PLANTS        # Substance 2 days 4 days remarks   101 1 Benzyl Cinnamate - -     2 Di-Limonene (Dipentene) - -     3 Cananga Odorata (Oleg Dejesus) (I) - -     4 Lichen Acid Mix - -    105 5 Mentha Piperita Oil (Peppermint Oil) - -     6 Sesquiterpenelactone mix - -     7 Tea Tree Oil, Oxidized - -     8 Wood Tar Mix - -     9 Abietic Acid - -    110 10 Lavendula Angustifolia Oil (Lavender Oil) - -     11 Fragrance mix II CT * 14% - -      Fragrance Mix I       12 [place]moss Absolute - -     13 Eugenol - -     14 Geraniol - -    115 15 Hydroxycitronellal (+) -     16 Isoeugenol - -     17 Cinnamic Aldehyde - -     18 Cinnamic Alcohol  - -      Fragrance mix II       19 Citronellol - -    120 20 Alpha-Hexylcinnamic Aldehyde    - -     21 Citral - -     22 Farnesol - -    123 23 Coumarin - -    Hexylcinnamic aldehyde, Coumarin, Farnesol, Hydroxyisohexy3-cyclohexene carboxaldehyde, citral, citrolellol   RUBBER CHEMICALS        # Substance 2 days 4 days remarks     Carbamate      124 1 Zinc Bis ( Diethyldithio carbamate ) (ZDEC) - -    125 2 Zinc Bis (Dibutyldithiocarbamate) - -     3 1,3-Diphenylguanidine (DPG) - -      Thiourea       4 Dibutylthiourea - -     5 Diphenyltiourea - -     6 Thiourea - -      Mercapto chemicals      130 7 Morpholinyl Mercaptobenzothiazole - -     8 K-Swntiolgen-5-Benzothiazyl-Sulfenamide - -     9 Dibenzothiazyl Disulfide - -      Thiuram chemicals       10  Dipentamethylenethiuram Disulfide - -     11 Tetraethylthiuram Disulfide (Disulfiram) - -    135 12 Tetramethylthiuram Disulfide - -     13 Tetramethyl Thiuram Monosulfide (TMTM) - -      4-Phenylenediamine derivatives       14 N-Isopropyl-N'-Phenyl-P-Phenylenediamine (IPPD) - -     15 Xsovyirc-H-Otfsitjkmgflouwn (DPPD) - -      Various Rubber Additives       16 Hydroquinone Monobenzylether  - -    140 17 Hexamethylenetetramine - -     18 4,4'-Dihydroxybiphenyl - -     19 Cyclohexylthiophtalimide - -    143 20 N-Phenyl-B-Naphthylamine - -     PLASTICS (Acrylates/Epoxy Systems)       # Substance 2 days 4 days remarks     Acrylates - -    144 1 2-Hydroxyethyl Methacrylate (HEMA) - -    145 2 1,4-Butandioldimethacrylate (BUDMA) - -     3  2-Ethylhexyl Acrylate - -     4 Bisphenol-A-Dimethacrylate  - -     5 Diurethane-Dimethacrylate - -     6 Ethyleneglycoldimethacrylate (EGDMA) - -    150 7 Pentaerythritoltriacrylate (ESTEFANI) - -     8 Triethylene Glycol Dimethacrylate (TEGDMA) - -      Synthetic material/additives        9 N-Icpm-Avvfxwyqatv - -     10 Tricresyl Phosphate - -     11 3-Ewms-Sxwxzekyzskam - -    155 12 Dioctyl phtalate(DEHP,DOP) / (Dimethylhexylphthalate)  - -     13 Dibutylphthalate - -     14 Dimethylphthalate - -     15 Toluene-2,4-Diisocyanate NA NA     16 Diphenylmethane-4,4''-Diisocyanate NA NA      EPOXY RESIN SYSTEMS        Reactive Solvents - -    160 17 Cresyl Glycidyl Ether NA NA     18 Butyl Glycidyl Ether - -     19 Phenyl Glycidyl Ether - -     20 1,4-Butanediol Diglycidyl Ether - -     21 1,6-Hexanediole Diglycidyl Ether - -      Hardener / Accelerator - -    165 22 Triethylenetetramine - -     23 Diethylenetriamine - -     24 Isophorone Diamine (IPD) - -     25 N,N-Dimethyl-P-Toluidine - -    169 26 Isobornyl Acrylate - -     OTHER PRODUCTS   stoma adhesive and stoma paste       # Substance Conc  % solv 2 days 4 days remarks   170 1 Cavilon barrier film As is        2 Aretha stoma cleanser  As is        3 Stoma bag adhesive As is       173 4 Barrier strip adhesive As is            Results of patch tests:                         Interpretation:  - Negative                    A    = Allergic      (+) Erythema    TI   = Toxic/irritant   + E + Infiltration    RaP = Relevance at Present     ++ E/I + Papulovesicle   Rpr  = Relevance Previously     +++ E/I/P + Blister     nR   = No Relevance    Atopy Screen (Placed Oct 2, 2023)  No Substance Readings (15 min) Evaluation   POS Histamine 1mg/ml +/++    NEG NaCl 0.9% -      No Substance Readings (15 min) Evaluation   1 Alternaria alternata (tenuis)  -    2 Cladosporium herbarum -    3 Aspergillus fumigatus -    4 Penicillium notatum -    5 Dermatophagoides pteronyssinus -    6 Dermatophagoides farinae -    7 Dog epithelium (canis spp) -    8 Cat hair (francia catus) -    9 Cockroach   (Blatella americana & germanica) -    10 Grass mix midwest   (Marianne, Orchard, Redtop, Wilber) -    11 Daniel grass (sorghum halepense) -    12 Weed mix   (common Cocklebur, Lamb s quarters, rough redroot Pigweed, Dock/Sorrel) -    13 Mug wort (artemisia vulgare) -    14 Ragweed giant/short (ambrosia spp) -    15 White birch (Betula papyrifera) -    16 Tree mix 1 (Pecan, Maple BHR, Oak RVW, american [place], black [place]) -    17 Red cedar (juniperus virginia) -    18 Tree mix 2   (white Joey, river/red Birch, black [place], common [place], american Elm) -    19 Box elder/Maple mix (acer spp) -    20 [place] shagbark (carya ovata) -           Conclusion: prick tests negatives, no signs for atopy. Maybe some delayed reaction to ragweed prick     [x] No relevant allergic reaction observed    [] Allergic reaction diagnosed against following allergens:      Interpretation/ remarks:   See later    [] Patient information given   [] ACDS CAMP information's (# ....) to following compounds: .....   [] General information's to following compounds: ......      Assessment & Plan:    ==> Final  Diagnosis:     # around the stoma severe, recurrent dermatitis  DDx allergic contact dermatitis to stoma adhesives or disinfectants  DDx irritant/microbial dermatitis with atopy  * undiagnosed new problem with uncertain prognosis   Patient developed a sudden onset, erythematous rash surrounding her stoma site about a year ago. Stoma related to SBO requiring emergent surgical intervention. Since that time, she has tried nystatin, topical antibiotics, oral antibiotics with no relief. No changes to stoma supplies. At this time, her dermatitis is improved from prior as she has simplified her cleansing regimen. At this time I am most suspicious for a contact dermatitis related to cleansing supplies vs adhesive. Antimicrobial is possible, however seems less likely given treatment attempts. Irritant from bowel contents also unlikely given her good stoma cares and minimal leakage.   - stop using previously used disinfectants   - start gentain violet for cleansing   - mometasone solution twice weekly   - allergy testing will be scheduled as above     # suspicion for atopic predisposition with  Prior prick tests positive to pine pollen  Prick tests negatives!  * stable chronic illness    These conclusions are made at the best of one's knowledge and belief based on the provided evidence such as patient's history and allergy test results and they can change over time or can be incomplete because of missing information's.    ==> Treatment Plan:  - see later  ___________________________    Staff: : provider    Follow-up in Derm-Allergy clinic for 2nd readings and final conclusions after 4 days   ___________________________    I spent a total of 16 minutes with Estrella Salas during today s  visit. This time was spent discussing all the individual test results, correlating them to the clinical relevance, counseling the patient and/or coordinating care       Again, thank you for allowing me to participate in the care of your  patient.        Sincerely,        Zak Block MD

## 2023-10-05 NOTE — PROGRESS NOTES
[facility] Dermato-allergology Note  Office visit  Encounter Date: Oct 6, 2023  ____________________________________________    CC: No chief complaint on file.      HPI:  (Oct 6, 2023)  Ms. Estrella Salas is a(n) 68 year old female who presents today as a return patient for allergy consultation  - Follow-up in Derm-Allergy clinic for 2nd readings and final conclusions after 4 days   - otherwise feeling well in usual state of health    Physical exam:  General: In no acute distress, well-developed, well-nourished  Eyes: no conjunctivitis  ENT: no signs of rhinitis   Pulmonary: no wheezing or coughing  Skin:Focused examination of the skin on test sites was performed = see test results below    Earlier History and Allergy exams:  (Oct 4, 2023)  - Follow-up in Derm-Allergy clinic for 1st readings of patch tests after 2 days     Earlier History and Allergy exams:  (Oct 2, 2023)  - Follow-up in Derm-Allergy clinic, as scheduled     Earlier History and Allergy exams:  (Jul 25, 2023)  She was referred patch testing related to possible allergic contact dermatitis around her ostomy site/stoma which has been going on for about a year   About a year ago, developed a rash around her stoma that came up suddenly, intensely pruritic, and she tried using antibiotic ointment , nystatin and stoma powder, oral antibiotic course which provided no relief. Had not tried changing the stoma supplies.   Has been having reactions to band-aid, has had crusting and irritation to the ear with wearing earrings however this has cleared up   Sulfa allergy - 2/2023 reported that liver enzymes were elevated with his, no skin eruption or anaphylaxis. No other reactions to medications.   Has had 2 reactions to tocilizumab and abatacept, developed a deeper itching sensation, no prior diagnosis of eczema although had a rash to her scalp which was quite itchy with no visible rash. During tocilizumab, at site of infusion, urticarial  reaction and hives on neck.   - well demarcated area of erythematous plaque surrounding ostomy site, improved from prior based on photos           Past Medical History:   There is no problem list on file for this patient.    No past medical history on file.    Allergies:  Allergies   Allergen Reactions    Sulfa Antibiotics Other (See Comments)     States liver studies went very high when on sulfa drugs.    Abatacept Itching     With subcutaneous and IV preparations, no rash.    Adhesive Tape Rash    Tocilizumab Rash       Medications:  Current Outpatient Medications   Medication    acetaminophen (TYLENOL) 500 MG tablet    albuterol (PROAIR HFA/PROVENTIL HFA/VENTOLIN HFA) 108 (90 Base) MCG/ACT inhaler    clobetasol (TEMOVATE) 0.05 % external ointment    cyclobenzaprine (FLEXERIL) 10 MG tablet    diclofenac (VOLTAREN) 1 % topical gel    DULoxetine (CYMBALTA) 20 MG capsule    estradiol (ESTRACE) 0.1 MG/GM vaginal cream    famotidine (PEPCID) 20 MG tablet    fish oil-omega-3 fatty acids 1000 MG capsule    fluconazole (DIFLUCAN) 150 MG tablet    gabapentin (NEURONTIN) 300 MG capsule    gentian violet 1 % external solution    hydrocortisone (CORTAID) 1 % external cream    hydroxychloroquine (PLAQUENIL) 200 MG tablet    isosorbide mononitrate (IMDUR) 60 MG 24 hr tablet    LINZESS 145 MCG capsule    Magnesium Citrate 100 MG TABS    metoprolol tartrate (LOPRESSOR) 25 MG tablet    mometasone (ELOCON) 0.1 % external solution    nitroGLYcerin (NITROSTAT) 0.4 MG sublingual tablet    ondansetron (ZOFRAN ODT) 4 MG ODT tab    polyethylene glycol 3350 POWD    Probiotic Product (MISC INTESTINAL FERNANDO REGULAT) CAPS    RABEprazole (ACIPHEX) 20 MG EC tablet    rOPINIRole (REQUIP ER) 2 MG 24 hr tablet    rosuvastatin (CRESTOR) 40 MG tablet    sulfaSALAzine (AZULFIDINE) 500 MG tablet    triamcinolone (KENALOG) 0.1 % external cream    vitamin B-12 (CYANOCOBALAMIN) 1000 MCG tablet     No current facility-administered medications for this  visit.       Social History:  The patient is retired. Formally an . Patient has the following hobbies or non-occupational exposure, loves to sew, has twin grandchildren. No recent travel or exposures.     Family History:  No family history on file.    Previous Labs, Allergy Tests, Dermatopathology, Imaging:  Previous prick testing - positive for pine allergy, no other allergies at that time     Referred By: Sandi Flynn MD  DERMATOLOGY [place]  [address] [address]     Allergy Tests:    Past Allergy Test- past testing about 10 years ago showed allergy to pine needles     Order for Future Allergy Testing:    [x] Outpatient  [] Inpatient: Monae..../ Bed ....       Skin Atopy (atopic dermatitis) [x] Yes   [] No .........  Contact allergies:   [x] Yes   [] No ..........  Hand eczema:   [] Yes   [x] No           Leading hand:   [] R   [] L       [] Ambidextrous         Drug allergies:        [x] Yes   [] No  which?....Sulfa - LFT rising, biologics are in question..    Urticaria/Angioedema  [] Yes   [x] No .........  Food Allergy:  [] Yes   [x] No  which?......  Pets :  [] Yes   [x] No  which?......         []  Rhinitis   [] Conjunctivitis   [x] Sinusitis   [] Polyposis   [] Otitis   [] Pharyngitis         [x]  Postnasal drip    []  none  Operations:   [] Tonsils   [] Septum   [] Sinus   [] Polyposis        [] Asthma bronchiale   [] Coughing      [x]  none  Symptoms (mostly Rhinoconjunctivitis and Asthma) aggravated by:  Season   [] I   [] II   [] III   [] IV   []V   []VI   []VII   []VIII   []IX   [x]X   [x]XI   [x]XII     [] perennial   Day time      [] morning   [] noon      [] evening        [] night    [] whole day........  [x]  none  Location/changes    [] inside        [] outside   [] mountains    [] sea     [] others.............   [x]  none  Triggers, specific     [] animals     [] plants     [] dust              [x] others ...........................    []   none  Triggers, others       [] work          [] psyche    [] sport            [] others .............................  [x]  none  Irritant                [] phys efforts [] smoke    [] heat/cold     [] odors  []others............... [x]  none    Order for PATCH TESTS  Reason for tests (suspected allergy): allergic contact dermatitis to stoma products  Known previous allergies: pine   Standardized panels  [x] Standard panel (40 tests)  [x] Preservatives & Antimicrobials (31 tests)  [x] Emulsifiers & Additives (25 tests)   [x] Perfumes/Flavours & Plants (25 tests)  [] Hairdresser panel (12 tests)  [x] Rubber Chemicals (22 tests)  [x] Plastics (26 tests)  [] Colorants/Dyes/Food additives (20 tests)  [] Metals (implants/dental) (24 tests)  [] Local anaesthetics/NSAIDs (13 tests)  [] Antibiotics & Antimycotics (14 tests)   [] Corticosteroids (15 tests)   [] Photopatch test (62 tests)   [] others: ...      [x] Patient's own products: stoma adhesive and stoma paste  DO NOT test if chemical or biological identity is unknown!   always ask from patient the product information and safety sheets (MSDS)       Order for PRICK TESTS    Reason for tests (suspected allergy): atopy?  Known previous allergies: previous tests positive to pine    Standardized prick panels  [x] Atopic panel (20 tests)  [] Pediatric Panel (12 tests)  [] Milk, Meat, Eggs, Grains (20 tests)   [] Dust, Epithelia, Feathers (10 tests)  [] Fish, Seafood, Shellfish (17 tests)  [] Nuts, Beans (14 tests)  [] Spice, Vegetable, Fruit (17 tests)  [] Pollen Panel = Tree, Grass, Weed (24 tests)  [] Others: ...      [] Patient's own products: ...  DO NOT test if chemical or biological identity is unknown!   always ask from patient the product information and safety sheets (MSDS)     Standardized intradermal tests  [] Penicillium notatum [] Aspergillus fumigatus [] House dust mites D.far & D. pteron  [] Cat    [] dog  [] Others: ...  [] Bee venom   [] Wasp venom   !!Specific protocol with dilutions!!       Order for Drug allergy tests (prick & Intradermal & patch tests)    [] Penicillin G  [] Ampicillin [] Cefazolin   [] Ceftriaxone   [] Ceftazidime  [] Bactrim    [] Others: ...  Order for ... as test date  ________________________________  RESULTS & EVALUATION of PATCH TESTS    Oct 2, 2023 application of patch tests:    Patch test readings after     [x] 2 days, [] 3 days [x] 4 days, [] 5 days,  Other duration: ...    STANDARD Series                                          # Substance 2 days 4 days remarks     1 Adam Mix [C] - -       2 Colophony - -       3  2-Mercaptobenzothiazole  - -       4 Methylisothiazolinone - -       5 Carba Mix - -       6 Thiuram Mix [A] - -       7 Bisphenol A Epoxy Resin - -       8 W-Viqb-Xgyqulgobvm-Formaldehyde Resin - -       9 Mercapto Mix [A] - -       10 Black Rubber Mix- PPD [B] - -       11 Potassium Dichromate  -  -       12 Balsam of [place] (Myroxylon Pereirae Resin) - -       13 Nickel Sulphate Hexahydrate - -       14 Mixed Dialkyl Thiourea - -       15 Paraben Mix [B] - -       16 Methyldibromo Glutaronitrile - -       17 Fragrance Mix 8% - -       18 2-Bromo-2-Nitropropane-1,3-Diol (Bronopol) CT - -       19 Lyral - -       20 Tixocortol-21- Pivalate CT - -       21 Diazolidinyl urea (Germall II) - -        22 Methyl Methacrylate - -       23 Cobalt (II) Chloride Hexahydrate - -       24 Fragrance Mix II  - -       25 Compositae Mix - -       26 Benzoyl Peroxide - -       27 Bacitracin - -       28 Formaldehyde - -       29 Methylchloroisothiazolinone / Methylisothiazolinone - -       30 Corticosteroid Mix CT - -       31 Sodium Lauryl Sulfate - -       32 Lanolin Alcohol - -       33 Turpentine - -       34 Cetylstearylalcohol - -       35 Chlorhexidine Dicluconate - -       36 Budesonide - -       37 Imidazolidinyl Urea  - -       38 Ethyl-2 Cyanoacrylate - -       39 Quaternium 15 (Dowicil 200) - -       40 Decyl Glucoside - -       PRESERVATIVES & ANTIMICROBIALS        # Substance 2 days 4 days remarks   41 1  1,2-Benzisothiazoline-3-One, Sodium Salt - -     2  1,3,5-Se (2-Hydroxyethyl) - Hexahydrotriazine (Grotan BK) - -     3 9-Zwjskpcvmrbut-7-Nitro-1, 3-Propanediol - -     4  3, 4, 4' - Triclocarban - -    45 5 4 - Chloro - 3 - Cresol - -     6 4 - Chloro - 4 - Xylenol (PCMX) - -     7 7-Ethylbicyclooxazolidine (Bioban [id number]) - -     8 Benzalkonium Chloride CT - -     9 Benzyl Alcohol - -    50 10 Cetalkonium Chloride - -     11 Cetylpyrimidine Chloride  - -     12 Chloroacetamide - -     13 DMDM Hydantoin - -     14 Glutaraldehyde - -    55 15 Triclosan - -     16 Glyoxal Trimeric Dihydrate - -     17 Iodopropynyl Butylcarbamate - -     18 Octylisothiazoline - -     19 Bithionol CT NA NA    60 20 Bioban P 1487 (Nitrobutyl) Morpholine/(Ethylnitro-Trimethylene) Dimorpholine - -     21 Phenoxyethanol - -     22 Phenyl Salicylate - -     23 Povidone Iodine - -     24 Sodium Benzoate - -    65 25 Sodium Disulfite - -     26 Sorbic Acid - -     27 Thimerosal - -     28 Melamine Formaldehyde Resin - -     29 Ethylenediamine Dihydrochloride - -      Parabens      70 30 Butyl-P-Hydroxybenzoate - -     31 Ethyl-P-Hydroxybenzoate - -     32 Methyl-P-Hydroxybenzoate - -    73 33 Propyl-P-Hydroxybenzoate - -     EMULSIFIERS & ADDITIVES       # Substance 2 days 4 days remarks   74 1 Polyethylene Glycol-400 - -    75 2 Cocamidopropyl Betaine - -     3 Amerchol L101 - -     4 Propylene Glycol - -     5 Triethanolamine - -     6 Sorbitane Sesquiolate CT - -    80 7 Isopropylmyristate - -     8 Polysorbate 80 CT - -     9 Amidoamine   (Stearamidopropyl Dimethylamine) - -     10 Oleamidopropyl Dimethylamine - -     11 Lauryl Glucoside - -    85 12 Coconut Diethanolamide  - -     13 2-Hydroxy-4-Methoxy Benzophenone (Oxybenzone) - -     14 Benzophenone-4 (Sulisobenzon) - -     15 Propolis - -     16 Dexpanthenol - -    90 17 Abitol - -     18  Tert-Butylhydroquinone - -     19 Benzyl Salicylate - -     20 Dimethylaminopropylamin (DMPA) - -     21 Zinc Pyrithione (Zinc Omadine)  - -    95 22 Se(Hydroxymethyl) Nitromethane  - -      Antioxidant       23 Dodecyl Gallate - -     24 Butylhydroxyanisole (BHA) - -     25 Butylhydroxytoluene (BHT) - -     26 Di-Alpha-Tocopherol (Vit E) - -    100 27 Propyl Gallate - -     PERFUMES, FLAVORS & PLANTS        # Substance 2 days 4 days remarks   101 1 Benzyl Cinnamate - -     2 Di-Limonene (Dipentene) - -     3 Cananga Odorata (Oleg Dejesus) (I) - -     4 Lichen Acid Mix - -    105 5 Mentha Piperita Oil (Peppermint Oil) - -     6 Sesquiterpenelactone mix - -     7 Tea Tree Oil, Oxidized - -     8 Wood Tar Mix - -     9 Abietic Acid - -    110 10 Lavendula Angustifolia Oil (Lavender Oil) - -     11 Fragrance mix II CT * 14% - -      Fragrance Mix I       12 Oakmoss Absolute - -     13 Eugenol - -     14 Geraniol - -    115 15 Hydroxycitronellal (+) -     16 Isoeugenol - -     17 Cinnamic Aldehyde - -     18 Cinnamic Alcohol  - -      Fragrance mix II       19 Citronellol - -    120 20 Alpha-Hexylcinnamic Aldehyde    - -     21 Citral - -     22 Farnesol - -    123 23 Coumarin - -    Hexylcinnamic aldehyde, Coumarin, Farnesol, Hydroxyisohexy3-cyclohexene carboxaldehyde, citral, citrolellol   RUBBER CHEMICALS        # Substance 2 days 4 days remarks     Carbamate      124 1 Zinc Bis ( Diethyldithio carbamate ) (ZDEC) - -    125 2 Zinc Bis (Dibutyldithiocarbamate) - -     3 1,3-Diphenylguanidine (DPG) - -      Thiourea       4 Dibutylthiourea - -     5 Diphenyltiourea - -     6 Thiourea - -      Mercapto chemicals      130 7 Morpholinyl Mercaptobenzothiazole - -     8 Y-Mdyerqsoeo-8-Benzothiazyl-Sulfenamide - -     9 Dibenzothiazyl Disulfide - -      Thiuram chemicals       10 Dipentamethylenethiuram Disulfide - -     11 Tetraethylthiuram Disulfide (Disulfiram) - -    135 12 Tetramethylthiuram Disulfide - -     13  Tetramethyl Thiuram Monosulfide (TMTM) - -      4-Phenylenediamine derivatives       14 N-Isopropyl-N'-Phenyl-P-Phenylenediamine (IPPD) - -     15 Drupaaur-K-Rhxquecbhubrcsem (DPPD) - -      Various Rubber Additives       16 Hydroquinone Monobenzylether  - -    140 17 Hexamethylenetetramine - -     18 4,4'-Dihydroxybiphenyl - -     19 Cyclohexylthiophtalimide - -    143 20 N-Phenyl-B-Naphthylamine - -     PLASTICS (Acrylates/Epoxy Systems)       # Substance 2 days 4 days remarks     Acrylates - -    144 1 2-Hydroxyethyl Methacrylate (HEMA) - -    145 2 1,4-Butandioldimethacrylate (BUDMA) - -     3  2-Ethylhexyl Acrylate - -     4 Bisphenol-A-Dimethacrylate  - -     5 Diurethane-Dimethacrylate - -     6 Ethyleneglycoldimethacrylate (EGDMA) - -    150 7 Pentaerythritoltriacrylate (ESTEFANI) - -     8 Triethylene Glycol Dimethacrylate (TEGDMA) - -      Synthetic material/additives        9 S-Tkfa-Usngwxinvxr - -     10 Tricresyl Phosphate - -     11 2-Dxtc-Hhujzrjnkqnaa - -    155 12 Dioctyl phtalate(DEHP,DOP) / (Dimethylhexylphthalate)  - -     13 Dibutylphthalate - -     14 Dimethylphthalate - -     15 Toluene-2,4-Diisocyanate NA NA     16 Diphenylmethane-4,4''-Diisocyanate NA NA      EPOXY RESIN SYSTEMS        Reactive Solvents - -    160 17 Cresyl Glycidyl Ether NA NA     18 Butyl Glycidyl Ether - -     19 Phenyl Glycidyl Ether - -     20 1,4-Butanediol Diglycidyl Ether - -     21 1,6-Hexanediole Diglycidyl Ether - -      Hardener / Accelerator - -    165 22 Triethylenetetramine - -     23 Diethylenetriamine - -     24 Isophorone Diamine (IPD) - -     25 N,N-Dimethyl-P-Toluidine - -    169 26 Isobornyl Acrylate - -     OTHER PRODUCTS   stoma adhesive and stoma paste       # Substance Conc  % solv 2 days 4 days remarks   170 1 Cavilon barrier film As is   -     2 Aretha stoma cleanser As is   -     3 Stoma bag adhesive As is   -    173 4 Barrier strip adhesive As is   -         Results of patch tests:                          Interpretation:  - Negative                    A    = Allergic      (+) Erythema    TI   = Toxic/irritant   + E + Infiltration    RaP = Relevance at Present     ++ E/I + Papulovesicle   Rpr  = Relevance Previously     +++ E/I/P + Blister     nR   = No Relevance    Atopy Screen (Placed Oct 2, 2023)  No Substance Readings (15 min) Evaluation   POS Histamine 1mg/ml +/++    NEG NaCl 0.9% -      No Substance Readings (15 min) Evaluation   1 Alternaria alternata (tenuis)  -    2 Cladosporium herbarum -    3 Aspergillus fumigatus -    4 Penicillium notatum -    5 Dermatophagoides pteronyssinus -    6 Dermatophagoides farinae -    7 Dog epithelium (canis spp) -    8 Cat hair (francia catus) -    9 Cockroach   (Blatella americana & germanica) -    10 Grass mix midwest   (Marianne, Orchard, Redtop, Wilber) -    11 Daniel grass (sorghum halepense) -    12 Weed mix   (common Cocklebur, Lamb s quarters, rough redroot Pigweed, Dock/Sorrel) -    13 Mug wort (artemisia vulgare) -    14 Ragweed giant/short (ambrosia spp) -    15 White birch (Betula papyrifera) -    16 Tree mix 1 (Pecan, Maple BHR, Oak RVW, american Athena, black Olive Branch) -    17 Red cedar (juniperus virginia) -    18 Tree mix 2   (white Joey, river/red Birch, black [place], common [place], american Elm) -    19 Box elder/Maple mix (acer spp) -    20 Lowman shagbark (carya ovata) -           Conclusion: prick tests negatives, no signs for atopy. Maybe some delayed reaction to ragweed prick     [x] No relevant allergic reaction observed    [] Allergic reaction diagnosed against following allergens:      Interpretation/ remarks:   No signs for relevant contact sensitization to any of the products tested. However, an irritant, microbial dermatitis is very likely    [] Patient information given   [] ACDS CAMP information's (# ....) to following compounds: .....   [] General information's to following compounds: ......      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  * stable chronic illness    These conclusions are made at the best of one's knowledge and belief based on the provided evidence such as patient's history and allergy test results and they can change over time or can be incomplete because of missing information's.    ==> Treatment Plan:  >> continue if possible with Gentian violet solution 1% (if not humid and oozing, then use it 1xweekly)  >> continue on acute inflamed areas Mometasone sol in combination with Gentian violet    --> if this doesn't work, we could go for formulation of Castellani paint (could burn) and/or as adhesive around the stoma a silver or gentian violet dressing. As silver dressing could use Mepilex silver or even Acticoat flex (Mckeon Nephew)  ___________________________    Staff: : provider    Follow-up in Derm-Allergy clinic if necessary  ___________________________    I spent a total of 30 minutes with Estrella Salas during today s  visit. This time was spent discussing all the individual test results, correlating them to the clinical relevance, counseling the patient and/or coordinating care

## 2023-10-06 ENCOUNTER — OFFICE VISIT (OUTPATIENT)
Dept: ALLERGY | Facility: CLINIC | Age: 68
End: 2023-10-06
Payer: MEDICARE

## 2023-10-06 DIAGNOSIS — L24.9 IRRITANT DERMATITIS: Primary | ICD-10-CM

## 2023-10-06 DIAGNOSIS — L24.B0: ICD-10-CM

## 2023-10-06 PROCEDURE — 99214 OFFICE O/P EST MOD 30 MIN: CPT | Performed by: DERMATOLOGY

## 2023-10-06 NOTE — NURSING NOTE
Chief Complaint   Patient presents with    Allergy Testing Followup     Patch testing day 5     Ashleigh Pacheco RN

## 2023-10-06 NOTE — PATIENT INSTRUCTIONS
STANDARD Series                                          # Substance 2 days 4 days remarks     1 Adam Mix [C] - -       2 Colophony - -       3  2-Mercaptobenzothiazole  - -       4 Methylisothiazolinone - -       5 Carba Mix - -       6 Thiuram Mix [A] - -       7 Bisphenol A Epoxy Resin - -       8 H-Hbsc-Vtgjclmxnho-Formaldehyde Resin - -       9 Mercapto Mix [A] - -       10 Black Rubber Mix- PPD [B] - -       11 Potassium Dichromate  -  -       12 Balsam of [place] (Myroxylon Pereirae Resin) - -       13 Nickel Sulphate Hexahydrate - -       14 Mixed Dialkyl Thiourea - -       15 Paraben Mix [B] - -       16 Methyldibromo Glutaronitrile - -       17 Fragrance Mix 8% - -       18 2-Bromo-2-Nitropropane-1,3-Diol (Bronopol) CT - -       19 Lyral - -       20 Tixocortol-21- Pivalate CT - -       21 Diazolidinyl urea (Germall II) - -        22 Methyl Methacrylate - -       23 Cobalt (II) Chloride Hexahydrate - -       24 Fragrance Mix II  - -       25 Compositae Mix - -       26 Benzoyl Peroxide - -       27 Bacitracin - -       28 Formaldehyde - -       29 Methylchloroisothiazolinone / Methylisothiazolinone - -       30 Corticosteroid Mix CT - -       31 Sodium Lauryl Sulfate - -       32 Lanolin Alcohol - -       33 Turpentine - -       34 Cetylstearylalcohol - -       35 Chlorhexidine Dicluconate - -       36 Budesonide - -       37 Imidazolidinyl Urea  - -       38 Ethyl-2 Cyanoacrylate - -       39 Quaternium 15 (Dowicil 200) - -       40 Decyl Glucoside - -      PRESERVATIVES & ANTIMICROBIALS        # Substance 2 days 4 days remarks   41 1  1,2-Benzisothiazoline-3-One, Sodium Salt - -     2  1,3,5-Se (2-Hydroxyethyl) - Hexahydrotriazine (Grotan BK) - -     3 7-Caeqhuwuzbvms-3-Nitro-1, 3-Propanediol - -     4  3, 4, 4' - Triclocarban - -    45 5 4 - Chloro - 3 - Cresol - -     6 4 - Chloro - 4 - Xylenol (PCMX) - -     7 7-Ethylbicyclooxazolidine (Bioban [id number]) - -     8 Benzalkonium Chloride CT - -     9  Benzyl Alcohol - -    50 10 Cetalkonium Chloride - -     11 Cetylpyrimidine Chloride  - -     12 Chloroacetamide - -     13 DMDM Hydantoin - -     14 Glutaraldehyde - -    55 15 Triclosan - -     16 Glyoxal Trimeric Dihydrate - -     17 Iodopropynyl Butylcarbamate - -     18 Octylisothiazoline - -     19 Bithionol CT NA NA    60 20 Bioban P 1487 (Nitrobutyl) Morpholine/(Ethylnitro-Trimethylene) Dimorpholine - -     21 Phenoxyethanol - -     22 Phenyl Salicylate - -     23 Povidone Iodine - -     24 Sodium Benzoate - -    65 25 Sodium Disulfite - -     26 Sorbic Acid - -     27 Thimerosal - -     28 Melamine Formaldehyde Resin - -     29 Ethylenediamine Dihydrochloride - -      Parabens      70 30 Butyl-P-Hydroxybenzoate - -     31 Ethyl-P-Hydroxybenzoate - -     32 Methyl-P-Hydroxybenzoate - -    73 33 Propyl-P-Hydroxybenzoate - -     EMULSIFIERS & ADDITIVES       # Substance 2 days 4 days remarks   74 1 Polyethylene Glycol-400 - -    75 2 Cocamidopropyl Betaine - -     3 Amerchol L101 - -     4 Propylene Glycol - -     5 Triethanolamine - -     6 Sorbitane Sesquiolate CT - -    80 7 Isopropylmyristate - -     8 Polysorbate 80 CT - -     9 Amidoamine   (Stearamidopropyl Dimethylamine) - -     10 Oleamidopropyl Dimethylamine - -     11 Lauryl Glucoside - -    85 12 Coconut Diethanolamide  - -     13 2-Hydroxy-4-Methoxy Benzophenone (Oxybenzone) - -     14 Benzophenone-4 (Sulisobenzon) - -     15 Propolis - -     16 Dexpanthenol - -    90 17 Abitol - -     18 Tert-Butylhydroquinone - -     19 Benzyl Salicylate - -     20 Dimethylaminopropylamin (DMPA) - -     21 Zinc Pyrithione (Zinc Omadine)  - -    95 22 Se(Hydroxymethyl) Nitromethane  - -      Antioxidant       23 Dodecyl Gallate - -     24 Butylhydroxyanisole (BHA) - -     25 Butylhydroxytoluene (BHT) - -     26 Di-Alpha-Tocopherol (Vit E) - -    100 27 Propyl Gallate - -     PERFUMES, FLAVORS & PLANTS        # Substance 2 days 4 days remarks   101 1 Benzyl  Cinnamate - -     2 Di-Limonene (Dipentene) - -     3 Cananga Odorata (Oleg Dejesus) (I) - -     4 Lichen Acid Mix - -    105 5 Mentha Piperita Oil (Peppermint Oil) - -     6 Sesquiterpenelactone mix - -     7 Tea Tree Oil, Oxidized - -     8 Wood Tar Mix - -     9 Abietic Acid - -    110 10 Lavendula Angustifolia Oil (Lavender Oil) - -     11 Fragrance mix II CT * 14% - -      Fragrance Mix I       12 [place]moss Absolute - -     13 Eugenol - -     14 Geraniol - -    115 15 Hydroxycitronellal (+) -     16 Isoeugenol - -     17 Cinnamic Aldehyde - -     18 Cinnamic Alcohol  - -      Fragrance mix II       19 Citronellol - -    120 20 Alpha-Hexylcinnamic Aldehyde    - -     21 Citral - -     22 Farnesol - -    123 23 Coumarin - -    Hexylcinnamic aldehyde, Coumarin, Farnesol, Hydroxyisohexy3-cyclohexene carboxaldehyde, citral, citrolellol   RUBBER CHEMICALS        # Substance 2 days 4 days remarks     Carbamate      124 1 Zinc Bis ( Diethyldithio carbamate ) (ZDEC) - -    125 2 Zinc Bis (Dibutyldithiocarbamate) - -     3 1,3-Diphenylguanidine (DPG) - -      Thiourea       4 Dibutylthiourea - -     5 Diphenyltiourea - -     6 Thiourea - -      Mercapto chemicals      130 7 Morpholinyl Mercaptobenzothiazole - -     8 T-Duwwtswkvl-5-Benzothiazyl-Sulfenamide - -     9 Dibenzothiazyl Disulfide - -      Thiuram chemicals       10 Dipentamethylenethiuram Disulfide - -     11 Tetraethylthiuram Disulfide (Disulfiram) - -    135 12 Tetramethylthiuram Disulfide - -     13 Tetramethyl Thiuram Monosulfide (TMTM) - -      4-Phenylenediamine derivatives       14 N-Isopropyl-N'-Phenyl-P-Phenylenediamine (IPPD) - -     15 Qbkqimoj-W-Rxkvockvuvxibuwb (DPPD) - -      Various Rubber Additives       16 Hydroquinone Monobenzylether  - -    140 17 Hexamethylenetetramine - -     18 4,4'-Dihydroxybiphenyl - -     19 Cyclohexylthiophtalimide - -    143 20 N-Phenyl-B-Naphthylamine - -     PLASTICS (Acrylates/Epoxy Systems)       # Substance 2 days  4 days remarks     Acrylates - -    144 1 2-Hydroxyethyl Methacrylate (HEMA) - -    145 2 1,4-Butandioldimethacrylate (BUDMA) - -     3  2-Ethylhexyl Acrylate - -     4 Bisphenol-A-Dimethacrylate  - -     5 Diurethane-Dimethacrylate - -     6 Ethyleneglycoldimethacrylate (EGDMA) - -    150 7 Pentaerythritoltriacrylate (ESTEFANI) - -     8 Triethylene Glycol Dimethacrylate (TEGDMA) - -      Synthetic material/additives        9 C-Vkhm-Vkchclnqkli - -     10 Tricresyl Phosphate - -     11 7-Stfy-Zpnqqzrssburl - -    155 12 Dioctyl phtalate(DEHP,DOP) / (Dimethylhexylphthalate)  - -     13 Dibutylphthalate - -     14 Dimethylphthalate - -     15 Toluene-2,4-Diisocyanate NA NA     16 Diphenylmethane-4,4''-Diisocyanate NA NA      EPOXY RESIN SYSTEMS        Reactive Solvents - -    160 17 Cresyl Glycidyl Ether NA NA     18 Butyl Glycidyl Ether - -     19 Phenyl Glycidyl Ether - -     20 1,4-Butanediol Diglycidyl Ether - -     21 1,6-Hexanediole Diglycidyl Ether - -      Hardener / Accelerator - -    165 22 Triethylenetetramine - -     23 Diethylenetriamine - -     24 Isophorone Diamine (IPD) - -     25 N,N-Dimethyl-P-Toluidine - -    169 26 Isobornyl Acrylate - -     OTHER PRODUCTS   stoma adhesive and stoma paste       # Substance Conc  % solv 2 days 4 days remarks   170 1 Cavilon barrier film As is   -     2 Aretha stoma cleanser As is   -     3 Stoma bag adhesive As is   -    173 4 Barrier strip adhesive As is   -         Results of patch tests:                         Interpretation:  - Negative                    A    = Allergic      (+) Erythema    TI   = Toxic/irritant   + E + Infiltration    RaP = Relevance at Present     ++ E/I + Papulovesicle   Rpr  = Relevance Previously     +++ E/I/P + Blister     nR   = No Relevance    Atopy Screen (Placed Oct 2, 2023)  No Substance Readings (15 min) Evaluation   POS Histamine 1mg/ml +/++    NEG NaCl 0.9% -      No Substance Readings (15 min) Evaluation   1 Alternaria alternata  (tenuis)  -    2 Cladosporium herbarum -    3 Aspergillus fumigatus -    4 Penicillium notatum -    5 Dermatophagoides pteronyssinus -    6 Dermatophagoides farinae -    7 Dog epithelium (canis spp) -    8 Cat hair (francia catus) -    9 Cockroach   (Blatella americana & germanica) -    10 Grass mix midwest   (Marianne, Orchard, [place], Wilber) -    11 Daniel grass (sorghum halepense) -    12 Weed mix   (common Cocklebur, Lamb s quarters, rough redroot Pigweed, Dock/Sorrel) -    13 Mug wort (artemisia vulgare) -    14 Ragweed giant/short (ambrosia spp) -    15 White birch (Betula papyrifera) -    16 Tree mix 1 (Pecan, Maple BHR, Oak RVW, american [place], black [place]) -    17 Red cedar (juniperus virginia) -    18 Tree mix 2   (white Joey, river/red Birch, black [place], common [place], american Elm) -    19 Box elder/Maple mix (acer spp) -    20 Doyle shagbark (carya ovata) -           Conclusion: prick tests negatives, no signs for atopy. Maybe some delayed reaction to ragweed prick     [x] No relevant allergic reaction observed    [] Allergic reaction diagnosed against following allergens:      Interpretation/ remarks:   No signs for relevant contact sensitization to any of the products tested. However, an irritant, microbial dermatitis is very likely    [] Patient information given   [] ACDS CAMP information's (# ....) to following compounds: .....   [] General information's to following compounds: ......      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  * stable chronic illness    These conclusions are made at the best of one's knowledge and belief based on the provided  evidence such as patient's history and allergy test results and they can change over time or can be incomplete because of missing information's.    ==> Treatment Plan:  >> continue if possible with Gentian violet solution 1% (if not humid and oozing, then use it 1xweekly)  >> continue on acute inflamed areas Mometasone sol in combination with Gentian violet    --> if this doesn't work, we could go for formulation of Castellani paint (could burn) and/or as adhesive around the stoma a silver or gentian violet dressing. As silver dressing could use Mepilex silver or even Acticoat flex (Smith Nephew)

## 2023-10-06 NOTE — LETTER
10/6/2023         RE: Estrella Salas  [address]  [facility] [address]        Dear Colleague,    Thank you for referring your patient, Estrella Salas, to the [facility]. Please see a copy of my visit note below.    [facility] Dermato-allergology Note  Office visit  Encounter Date: Oct 6, 2023  ____________________________________________    CC: No chief complaint on file.      HPI:  (Oct 6, 2023)  Ms. Estrella Salas is a(n) 68 year old female who presents today as a return patient for allergy consultation  - Follow-up in Derm-Allergy clinic for 2nd readings and final conclusions after 4 days   - otherwise feeling well in usual state of health    Physical exam:  General: In no acute distress, well-developed, well-nourished  Eyes: no conjunctivitis  ENT: no signs of rhinitis   Pulmonary: no wheezing or coughing  Skin:Focused examination of the skin on test sites was performed = see test results below    Earlier History and Allergy exams:  (Oct 4, 2023)  - Follow-up in Derm-Allergy clinic for 1st readings of patch tests after 2 days     Earlier History and Allergy exams:  (Oct 2, 2023)  - Follow-up in Derm-Allergy clinic, as scheduled     Earlier History and Allergy exams:  (Jul 25, 2023)  She was referred patch testing related to possible allergic contact dermatitis around her ostomy site/stoma which has been going on for about a year   About a year ago, developed a rash around her stoma that came up suddenly, intensely pruritic, and she tried using antibiotic ointment , nystatin and stoma powder, oral antibiotic course which provided no relief. Had not tried changing the stoma supplies.   Has been having reactions to band-aid, has had crusting and irritation to the ear with wearing earrings however this has cleared up   Sulfa allergy - 2/2023 reported that liver enzymes were elevated with his, no skin eruption or anaphylaxis. No other reactions to  medications.   Has had 2 reactions to tocilizumab and abatacept, developed a deeper itching sensation, no prior diagnosis of eczema although had a rash to her scalp which was quite itchy with no visible rash. During tocilizumab, at site of infusion, urticarial reaction and hives on neck.   - well demarcated area of erythematous plaque surrounding ostomy site, improved from prior based on photos           Past Medical History:   There is no problem list on file for this patient.    No past medical history on file.    Allergies:  Allergies   Allergen Reactions     Sulfa Antibiotics Other (See Comments)     States liver studies went very high when on sulfa drugs.     Abatacept Itching     With subcutaneous and IV preparations, no rash.     Adhesive Tape Rash     Tocilizumab Rash       Medications:  Current Outpatient Medications   Medication     acetaminophen (TYLENOL) 500 MG tablet     albuterol (PROAIR HFA/PROVENTIL HFA/VENTOLIN HFA) 108 (90 Base) MCG/ACT inhaler     clobetasol (TEMOVATE) 0.05 % external ointment     cyclobenzaprine (FLEXERIL) 10 MG tablet     diclofenac (VOLTAREN) 1 % topical gel     DULoxetine (CYMBALTA) 20 MG capsule     estradiol (ESTRACE) 0.1 MG/GM vaginal cream     famotidine (PEPCID) 20 MG tablet     fish oil-omega-3 fatty acids 1000 MG capsule     fluconazole (DIFLUCAN) 150 MG tablet     gabapentin (NEURONTIN) 300 MG capsule     gentian violet 1 % external solution     hydrocortisone (CORTAID) 1 % external cream     hydroxychloroquine (PLAQUENIL) 200 MG tablet     isosorbide mononitrate (IMDUR) 60 MG 24 hr tablet     LINZESS 145 MCG capsule     Magnesium Citrate 100 MG TABS     metoprolol tartrate (LOPRESSOR) 25 MG tablet     mometasone (ELOCON) 0.1 % external solution     nitroGLYcerin (NITROSTAT) 0.4 MG sublingual tablet     ondansetron (ZOFRAN ODT) 4 MG ODT tab     polyethylene glycol 3350 POWD     Probiotic Product (MISC INTESTINAL FERNANDO REGULAT) CAPS     RABEprazole (ACIPHEX) 20 MG EC  tablet     rOPINIRole (REQUIP ER) 2 MG 24 hr tablet     rosuvastatin (CRESTOR) 40 MG tablet     sulfaSALAzine (AZULFIDINE) 500 MG tablet     triamcinolone (KENALOG) 0.1 % external cream     vitamin B-12 (CYANOCOBALAMIN) 1000 MCG tablet     No current facility-administered medications for this visit.       Social History:  The patient is retired. Formally an . Patient has the following hobbies or non-occupational exposure, loves to sew, has twin grandchildren. No recent travel or exposures.     Family History:  No family history on file.    Previous Labs, Allergy Tests, Dermatopathology, Imaging:  Previous prick testing - positive for pine allergy, no other allergies at that time     Referred By: Sandi Flynn MD  DERMATOLOGY [address] [address]     Allergy Tests:    Past Allergy Test- past testing about 10 years ago showed allergy to pine needles     Order for Future Allergy Testing:    [x] Outpatient  [] Inpatient: Monae..../ Bed ....       Skin Atopy (atopic dermatitis) [x] Yes   [] No .........  Contact allergies:   [x] Yes   [] No ..........  Hand eczema:   [] Yes   [x] No           Leading hand:   [] R   [] L       [] Ambidextrous         Drug allergies:        [x] Yes   [] No  which?....Sulfa - LFT rising, biologics are in question..    Urticaria/Angioedema  [] Yes   [x] No .........  Food Allergy:  [] Yes   [x] No  which?......  Pets :  [] Yes   [x] No  which?......         []  Rhinitis   [] Conjunctivitis   [x] Sinusitis   [] Polyposis   [] Otitis   [] Pharyngitis         [x]  Postnasal drip    []  none  Operations:   [] Tonsils   [] Septum   [] Sinus   [] Polyposis        [] Asthma bronchiale   [] Coughing      [x]  none  Symptoms (mostly Rhinoconjunctivitis and Asthma) aggravated by:  Season   [] I   [] II   [] III   [] IV   []V   []VI   []VII   []VIII   []IX   [x]X   [x]XI   [x]XII     [] perennial   Day time      [] morning   [] noon      [] evening         [] night    [] whole day........  [x]  none  Location/changes    [] inside        [] outside   [] mountains    [] sea     [] others.............   [x]  none  Triggers, specific     [] animals     [] plants     [] dust              [x] others ...........................    []  none  Triggers, others       [] work          [] psyche    [] sport            [] others .............................  [x]  none  Irritant                [] phys efforts [] smoke    [] heat/cold     [] odors  []others............... [x]  none    Order for PATCH TESTS  Reason for tests (suspected allergy): allergic contact dermatitis to stoma products  Known previous allergies: pine   Standardized panels  [x] Standard panel (40 tests)  [x] Preservatives & Antimicrobials (31 tests)  [x] Emulsifiers & Additives (25 tests)   [x] Perfumes/Flavours & Plants (25 tests)  [] Hairdresser panel (12 tests)  [x] Rubber Chemicals (22 tests)  [x] Plastics (26 tests)  [] Colorants/Dyes/Food additives (20 tests)  [] Metals (implants/dental) (24 tests)  [] Local anaesthetics/NSAIDs (13 tests)  [] Antibiotics & Antimycotics (14 tests)   [] Corticosteroids (15 tests)   [] Photopatch test (62 tests)   [] others: ...      [x] Patient's own products: stoma adhesive and stoma paste  DO NOT test if chemical or biological identity is unknown!   always ask from patient the product information and safety sheets (MSDS)       Order for PRICK TESTS    Reason for tests (suspected allergy): atopy?  Known previous allergies: previous tests positive to pine    Standardized prick panels  [x] Atopic panel (20 tests)  [] Pediatric Panel (12 tests)  [] Milk, Meat, Eggs, Grains (20 tests)   [] Dust, Epithelia, Feathers (10 tests)  [] Fish, Seafood, Shellfish (17 tests)  [] Nuts, Beans (14 tests)  [] Spice, Vegetable, Fruit (17 tests)  [] Pollen Panel = Tree, Grass, Weed (24 tests)  [] Others: ...      [] Patient's own products: ...  DO NOT test if chemical or biological identity  is unknown!   always ask from patient the product information and safety sheets (MSDS)     Standardized intradermal tests  [] Penicillium notatum [] Aspergillus fumigatus [] House dust mites D.far & D. pteron  [] Cat    [] dog  [] Others: ...  [] Bee venom   [] Wasp venom  !!Specific protocol with dilutions!!       Order for Drug allergy tests (prick & Intradermal & patch tests)    [] Penicillin G  [] Ampicillin [] Cefazolin   [] Ceftriaxone   [] Ceftazidime  [] Bactrim    [] Others: ...  Order for ... as test date  ________________________________  RESULTS & EVALUATION of PATCH TESTS    Oct 2, 2023 application of patch tests:    Patch test readings after     [x] 2 days, [] 3 days [x] 4 days, [] 5 days,  Other duration: ...    STANDARD Series                                          # Substance 2 days 4 days remarks     1 Adam Mix [C] - -       2 Colophony - -       3  2-Mercaptobenzothiazole  - -       4 Methylisothiazolinone - -       5 Carba Mix - -       6 Thiuram Mix [A] - -       7 Bisphenol A Epoxy Resin - -       8 S-Tist-Pskssmistrf-Formaldehyde Resin - -       9 Mercapto Mix [A] - -       10 Black Rubber Mix- PPD [B] - -       11 Potassium Dichromate  -  -       12 Balsam of [place] (Myroxylon Pereirae Resin) - -       13 Nickel Sulphate Hexahydrate - -       14 Mixed Dialkyl Thiourea - -       15 Paraben Mix [B] - -       16 Methyldibromo Glutaronitrile - -       17 Fragrance Mix 8% - -       18 2-Bromo-2-Nitropropane-1,3-Diol (Bronopol) CT - -       19 Lyral - -       20 Tixocortol-21- Pivalate CT - -       21 Diazolidinyl urea (Germall II) - -        22 Methyl Methacrylate - -       23 Cobalt (II) Chloride Hexahydrate - -       24 Fragrance Mix II  - -       25 Compositae Mix - -       26 Benzoyl Peroxide - -       27 Bacitracin - -       28 Formaldehyde - -       29 Methylchloroisothiazolinone / Methylisothiazolinone - -       30 Corticosteroid Mix CT - -       31 Sodium Lauryl Sulfate - -       32  Lanolin Alcohol - -       33 Turpentine - -       34 Cetylstearylalcohol - -       35 Chlorhexidine Dicluconate - -       36 Budesonide - -       37 Imidazolidinyl Urea  - -       38 Ethyl-2 Cyanoacrylate - -       39 Quaternium 15 (Dowicil 200) - -       40 Decyl Glucoside - -      PRESERVATIVES & ANTIMICROBIALS        # Substance 2 days 4 days remarks   41 1  1,2-Benzisothiazoline-3-One, Sodium Salt - -     2  1,3,5-Se (2-Hydroxyethyl) - Hexahydrotriazine (Grotan BK) - -     3 8-Vymchrlugyfer-9-Nitro-1, 3-Propanediol - -     4  3, 4, 4' - Triclocarban - -    45 5 4 - Chloro - 3 - Cresol - -     6 4 - Chloro - 4 - Xylenol (PCMX) - -     7 7-Ethylbicyclooxazolidine (Bioban BF7365) - -     8 Benzalkonium Chloride CT - -     9 Benzyl Alcohol - -    50 10 Cetalkonium Chloride - -     11 Cetylpyrimidine Chloride  - -     12 Chloroacetamide - -     13 DMDM Hydantoin - -     14 Glutaraldehyde - -    55 15 Triclosan - -     16 Glyoxal Trimeric Dihydrate - -     17 Iodopropynyl Butylcarbamate - -     18 Octylisothiazoline - -     19 Bithionol CT NA NA    60 20 Bioban P 1487 (Nitrobutyl) Morpholine/(Ethylnitro-Trimethylene) Dimorpholine - -     21 Phenoxyethanol - -     22 Phenyl Salicylate - -     23 Povidone Iodine - -     24 Sodium Benzoate - -    65 25 Sodium Disulfite - -     26 Sorbic Acid - -     27 Thimerosal - -     28 Melamine Formaldehyde Resin - -     29 Ethylenediamine Dihydrochloride - -      Parabens      70 30 Butyl-P-Hydroxybenzoate - -     31 Ethyl-P-Hydroxybenzoate - -     32 Methyl-P-Hydroxybenzoate - -    73 33 Propyl-P-Hydroxybenzoate - -     EMULSIFIERS & ADDITIVES       # Substance 2 days 4 days remarks   74 1 Polyethylene Glycol-400 - -    75 2 Cocamidopropyl Betaine - -     3 Amerchol L101 - -     4 Propylene Glycol - -     5 Triethanolamine - -     6 Sorbitane Sesquiolate CT - -    80 7 Isopropylmyristate - -     8 Polysorbate 80 CT - -     9 Amidoamine   (Stearamidopropyl Dimethylamine) - -      10 Oleamidopropyl Dimethylamine - -     11 Lauryl Glucoside - -    85 12 Coconut Diethanolamide  - -     13 2-Hydroxy-4-Methoxy Benzophenone (Oxybenzone) - -     14 Benzophenone-4 (Sulisobenzon) - -     15 Propolis - -     16 Dexpanthenol - -    90 17 Abitol - -     18 Tert-Butylhydroquinone - -     19 Benzyl Salicylate - -     20 Dimethylaminopropylamin (DMPA) - -     21 Zinc Pyrithione (Zinc Omadine)  - -    95 22 Se(Hydroxymethyl) Nitromethane  - -      Antioxidant       23 Dodecyl Gallate - -     24 Butylhydroxyanisole (BHA) - -     25 Butylhydroxytoluene (BHT) - -     26 Di-Alpha-Tocopherol (Vit E) - -    100 27 Propyl Gallate - -     PERFUMES, FLAVORS & PLANTS        # Substance 2 days 4 days remarks   101 1 Benzyl Cinnamate - -     2 Di-Limonene (Dipentene) - -     3 Cananga Odorata (Oleg Dejesus) (I) - -     4 Lichen Acid Mix - -    105 5 Mentha Piperita Oil (Peppermint Oil) - -     6 Sesquiterpenelactone mix - -     7 Tea Tree Oil, Oxidized - -     8 Wood Tar Mix - -     9 Abietic Acid - -    110 10 Lavendula Angustifolia Oil (Lavender Oil) - -     11 Fragrance mix II CT * 14% - -      Fragrance Mix I       12 [place]oss Absolute - -     13 Eugenol - -     14 Geraniol - -    115 15 Hydroxycitronellal (+) -     16 Isoeugenol - -     17 Cinnamic Aldehyde - -     18 Cinnamic Alcohol  - -      Fragrance mix II       19 Citronellol - -    120 20 Alpha-Hexylcinnamic Aldehyde    - -     21 Citral - -     22 Farnesol - -    123 23 Coumarin - -    Hexylcinnamic aldehyde, Coumarin, Farnesol, Hydroxyisohexy3-cyclohexene carboxaldehyde, citral, citrolellol   RUBBER CHEMICALS        # Substance 2 days 4 days remarks     Carbamate      124 1 Zinc Bis ( Diethyldithio carbamate ) (ZDEC) - -    125 2 Zinc Bis (Dibutyldithiocarbamate) - -     3 1,3-Diphenylguanidine (DPG) - -      Thiourea       4 Dibutylthiourea - -     5 Diphenyltiourea - -     6 Thiourea - -      Mercapto chemicals      130 7 Morpholinyl  Mercaptobenzothiazole - -     8 S-Osfqbuoqxi-8-Benzothiazyl-Sulfenamide - -     9 Dibenzothiazyl Disulfide - -      Thiuram chemicals       10 Dipentamethylenethiuram Disulfide - -     11 Tetraethylthiuram Disulfide (Disulfiram) - -    135 12 Tetramethylthiuram Disulfide - -     13 Tetramethyl Thiuram Monosulfide (TMTM) - -      4-Phenylenediamine derivatives       14 N-Isopropyl-N'-Phenyl-P-Phenylenediamine (IPPD) - -     15 Dglzymlt-X-Nbkagozjfembvdwb (DPPD) - -      Various Rubber Additives       16 Hydroquinone Monobenzylether  - -    140 17 Hexamethylenetetramine - -     18 4,4'-Dihydroxybiphenyl - -     19 Cyclohexylthiophtalimide - -    143 20 N-Phenyl-B-Naphthylamine - -     PLASTICS (Acrylates/Epoxy Systems)       # Substance 2 days 4 days remarks     Acrylates - -    144 1 2-Hydroxyethyl Methacrylate (HEMA) - -    145 2 1,4-Butandioldimethacrylate (BUDMA) - -     3  2-Ethylhexyl Acrylate - -     4 Bisphenol-A-Dimethacrylate  - -     5 Diurethane-Dimethacrylate - -     6 Ethyleneglycoldimethacrylate (EGDMA) - -    150 7 Pentaerythritoltriacrylate (ESTEFANI) - -     8 Triethylene Glycol Dimethacrylate (TEGDMA) - -      Synthetic material/additives        9 W-Jjxd-Dljaywrmnga - -     10 Tricresyl Phosphate - -     11 5-Dtdv-Tvllptwvnmvqj - -    155 12 Dioctyl phtalate(DEHP,DOP) / (Dimethylhexylphthalate)  - -     13 Dibutylphthalate - -     14 Dimethylphthalate - -     15 Toluene-2,4-Diisocyanate NA NA     16 Diphenylmethane-4,4''-Diisocyanate NA NA      EPOXY RESIN SYSTEMS        Reactive Solvents - -    160 17 Cresyl Glycidyl Ether NA NA     18 Butyl Glycidyl Ether - -     19 Phenyl Glycidyl Ether - -     20 1,4-Butanediol Diglycidyl Ether - -     21 1,6-Hexanediole Diglycidyl Ether - -      Hardener / Accelerator - -    165 22 Triethylenetetramine - -     23 Diethylenetriamine - -     24 Isophorone Diamine (IPD) - -     25 N,N-Dimethyl-P-Toluidine - -    169 26 Isobornyl Acrylate - -     OTHER PRODUCTS   stoma  adhesive and stoma paste       # Substance Conc  % solv 2 days 4 days remarks   170 1 Cavilon barrier film As is   -     2 Aretha stoma cleanser As is   -     3 Stoma bag adhesive As is   -    173 4 Barrier strip adhesive As is   -         Results of patch tests:                         Interpretation:  - Negative                    A    = Allergic      (+) Erythema    TI   = Toxic/irritant   + E + Infiltration    RaP = Relevance at Present     ++ E/I + Papulovesicle   Rpr  = Relevance Previously     +++ E/I/P + Blister     nR   = No Relevance    Atopy Screen (Placed Oct 2, 2023)  No Substance Readings (15 min) Evaluation   POS Histamine 1mg/ml +/++    NEG NaCl 0.9% -      No Substance Readings (15 min) Evaluation   1 Alternaria alternata (tenuis)  -    2 Cladosporium herbarum -    3 Aspergillus fumigatus -    4 Penicillium notatum -    5 Dermatophagoides pteronyssinus -    6 Dermatophagoides farinae -    7 Dog epithelium (canis spp) -    8 Cat hair (francia catus) -    9 Cockroach   (Blatella americana & germanica) -    10 Grass mix [facility]   (Marianne, Orchard, Redtop, Wilber) -    11 Daniel grass (sorghum halepense) -    12 Weed mix   (common Cocklebur, Lamb s quarters, rough redroot Pigweed, Dock/Sorrel) -    13 Mug wort (artemisia vulgare) -    14 Ragweed giant/short (ambrosia spp) -    15 White birch (Betula papyrifera) -    16 Tree mix 1 (Pecan, Maple BHR, Oak RVW, american [place], black [place]) -    17 Red cedar (juniperus virginia) -    18 Tree mix 2   (white Joey, river/red Birch, black Barry, common [place], american Elm) -    19 Box elder/Maple mix (acer spp) -    20 [place] shagbark (carya ovata) -           Conclusion: prick tests negatives, no signs for atopy. Maybe some delayed reaction to ragweed prick     [x] No relevant allergic reaction observed    [] Allergic reaction diagnosed against following allergens:      Interpretation/ remarks:   No signs for relevant contact sensitization to any of the  products tested. However, an irritant, microbial dermatitis is very likely    [] Patient information given   [] ACDS CAMP information's (# ....) to following compounds: .....   [] General information's to following compounds: ......      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  * stable chronic illness    These conclusions are made at the best of one's knowledge and belief based on the provided evidence such as patient's history and allergy test results and they can change over time or can be incomplete because of missing information's.    ==> Treatment Plan:  >> continue if possible with Gentian violet solution 1% (if not humid and oozing, then use it 1xweekly)  >> continue on acute inflamed areas Mometasone sol in combination with Gentian violet    --> if this doesn't work, we could go for formulation of Castellani paint (could burn) and/or as adhesive around the stoma a silver or gentian violet dressing. As silver dressing could use Mepilex silver or even Acticoat flex (Mckeon Nephew)  ___________________________    Staff: : provider    Follow-up in Derm-Allergy clinic if necessary  ___________________________    I spent a total of 30 minutes with Estrella Salas during today s  visit. This time was spent discussing all the individual test results, correlating them to the clinical relevance, counseling the patient and/or coordinating care        Again, thank you for allowing me to participate in the care of your patient.        Sincerely,        Zak Block MD

## 2023-10-10 ENCOUNTER — TELEPHONE (OUTPATIENT)
Dept: ALLERGY | Facility: CLINIC | Age: 68
End: 2023-10-10
Payer: MEDICARE

## 2023-10-10 NOTE — TELEPHONE ENCOUNTER
[facility] Call Center    Phone Message    May a detailed message be left on voicemail: yes     Reason for Call: Other: Per Mateo at the pharmacy there is not 1% of the gentian violet 1 % external solution, so can the pharmacy replace with 2%.     Action Taken: Message routed to:  [facility] Allergy    Travel Screening: Not Applicable

## 2023-10-10 NOTE — TELEPHONE ENCOUNTER
Spoke with Dr. Block about substituting gentian violet 2% for gentian violet 1% due to availability, approval received, updated pharmacist at [facility] via phone call.

## 2024-03-10 ENCOUNTER — HEALTH MAINTENANCE LETTER (OUTPATIENT)
Age: 69
End: 2024-03-10

## 2024-05-25 ENCOUNTER — MYC REFILL (OUTPATIENT)
Dept: ALLERGY | Facility: CLINIC | Age: 69
End: 2024-05-25
Payer: MEDICARE

## 2024-05-25 DIAGNOSIS — L24.B0: ICD-10-CM

## 2024-05-25 DIAGNOSIS — L24.9 IRRITANT DERMATITIS: ICD-10-CM

## 2025-01-31 PROCEDURE — 88305 TISSUE EXAM BY PATHOLOGIST: CPT | Mod: TC,ORL | Performed by: DERMATOLOGY

## 2025-01-31 PROCEDURE — 88305 TISSUE EXAM BY PATHOLOGIST: CPT | Mod: 26 | Performed by: DERMATOLOGY

## 2025-02-03 ENCOUNTER — LAB REQUISITION (OUTPATIENT)
Dept: LAB | Facility: CLINIC | Age: 70
End: 2025-02-03
Payer: MEDICARE

## 2025-02-03 DIAGNOSIS — D48.5 NEOPLASM OF UNCERTAIN BEHAVIOR OF SKIN: ICD-10-CM

## 2025-02-04 LAB
PATH REPORT.COMMENTS IMP SPEC: NORMAL
PATH REPORT.COMMENTS IMP SPEC: NORMAL
PATH REPORT.FINAL DX SPEC: NORMAL
PATH REPORT.GROSS SPEC: NORMAL
PATH REPORT.MICROSCOPIC SPEC OTHER STN: NORMAL
PATH REPORT.RELEVANT HX SPEC: NORMAL

## 2025-02-16 ENCOUNTER — HEALTH MAINTENANCE LETTER (OUTPATIENT)
Age: 70
End: 2025-02-16

## 2025-03-16 ENCOUNTER — HEALTH MAINTENANCE LETTER (OUTPATIENT)
Age: 70
End: 2025-03-16